# Patient Record
Sex: FEMALE | Race: WHITE | Employment: OTHER | URBAN - METROPOLITAN AREA
[De-identification: names, ages, dates, MRNs, and addresses within clinical notes are randomized per-mention and may not be internally consistent; named-entity substitution may affect disease eponyms.]

---

## 2017-07-26 ENCOUNTER — OFFICE VISIT (OUTPATIENT)
Dept: INTERNAL MEDICINE CLINIC | Age: 77
End: 2017-07-26

## 2017-07-26 VITALS
WEIGHT: 120 LBS | HEART RATE: 68 BPM | SYSTOLIC BLOOD PRESSURE: 117 MMHG | DIASTOLIC BLOOD PRESSURE: 76 MMHG | RESPIRATION RATE: 12 BRPM | OXYGEN SATURATION: 97 % | BODY MASS INDEX: 21.26 KG/M2 | HEIGHT: 63 IN

## 2017-07-26 DIAGNOSIS — F02.80 ALZHEIMER'S DISEASE OF OTHER ONSET WITHOUT BEHAVIORAL DISTURBANCE: ICD-10-CM

## 2017-07-26 DIAGNOSIS — R19.8 GAGGING EPISODE: Primary | ICD-10-CM

## 2017-07-26 DIAGNOSIS — G30.8 ALZHEIMER'S DISEASE OF OTHER ONSET WITHOUT BEHAVIORAL DISTURBANCE: ICD-10-CM

## 2017-07-26 RX ORDER — MEMANTINE HYDROCHLORIDE 28 MG/1
CAPSULE, EXTENDED RELEASE ORAL DAILY
COMMUNITY
End: 2018-08-28 | Stop reason: SDUPTHER

## 2017-07-26 RX ORDER — IBUPROFEN 200 MG
TABLET ORAL
COMMUNITY
End: 2018-07-11 | Stop reason: SDUPTHER

## 2017-07-26 RX ORDER — METOPROLOL SUCCINATE 25 MG/1
TABLET, EXTENDED RELEASE ORAL DAILY
COMMUNITY
End: 2018-03-20 | Stop reason: SDUPTHER

## 2017-07-26 NOTE — PROGRESS NOTES
HISTORY OF PRESENT ILLNESS    New patient to my practice, referred to me by self. Prior medical care has been from Dr. Zainab Fernández in [address] [facility]. Her to be closer to [place], [employer]. she works as a retire  . her  past medical history was reviewed, discussed, and summarized in the list below. Hx is largely provided by her  who interrupts her. Reports \"Gagging sensation\" yesterday. Past hx of same. Her throat was spasming and had trouble swallowing. It was after breakfast..  It lasted for \"hours\" and she went to bed. She slept for a while and woke up feeling. Was referred to GI by PCP in the past, but was told she may not need to go.  says it was due to stress. Denies significant dysphagia or vomiting. Ate breakfast and lunch today without significant issues. Chronic back pain. Hx of surgery. Dementia - she does not drive for about 1 year. Completed 1 year clinic trial at [facility] 6/2016-6/2017. Has MRI, PET scan at baseline and end. MRI showed a possible infarct. She is taking Namenda. Was taking aricept but was stopped 2 years ago since it may interact with metoprolol. Has a syncopal episode which also may have been related to taking metoprolol. Saw neuro Dr. Romelia Garsia    Review of Systems   All other systems reviewed and are negative, except as noted in HPI      Past Medical and Surgical History  Past Medical History:   Diagnosis Date    Alzheimer's dementia     mild-moderate. clinical trial 6/2017-6/2017 [facility] [place] Lumbar stenosis [place]    surgery 2011    Normal cardiac stress test 07/2017    PVCs (premature ventricular contractions)     Retinal disease     Dr. Mary Chairez Subdural hematoma [facility])         has a past surgical history that includes lumbar laminectomy (2011).     Current Outpatient Prescriptions   Medication Sig    metoprolol succinate (TOPROL-XL) 25 mg XL tablet Take  by mouth daily.  ASPIRIN (ASPIR-81 PO) Take  by mouth.  memantine ER (NAMENDA XR) 28 mg capsule Take  by mouth daily.  ibuprofen (ADVIL) 200 mg tablet Take  by mouth.  OTHER Prevagen 1 capsule daily     No current facility-administered medications for this visit. family history is not on file. Physical Exam   Nursing note and vitals reviewed. Blood pressure 117/76, pulse 68, resp. rate 12, height 5' 3\" (1.6 m), weight 120 lb (54.4 kg), SpO2 97 %. Constitutional: oriented to person, place, and time. No distress. Eyes: Conjunctivae are normal.   HEENT:  No cervical lymphadenopathy. No thyroid nodules or goiter  Cardiovascular: Normal rate. Regular rhythm, no murmurs, rubs. No edema  Pulmonary/Chest: Effort normal. clear to auscultation  Abdominal: soft, non-tender, non-distended  Musculoskeletal:     Neurological: Alert and oriented to person, place. Cranial nerves grossly intact. Normal gait   Skin: No rash noted. Psychiatric: Normal mood and affect. Behavior is normal.     ASSESSMENT and PLAN  Diagnoses and all orders for this visit:    1. Gagging episode-- does not sound like this was choking. Improved with sleep. Perhaps it was a hiccough or spasm of diaphragm. Panic attack? Observe for now. Consider esophagram.  Currently asymptomatic    2. Alzheimer's disease of other onset without behavioral disturbance  Mild to moderate. Her  completes her thoughts  I am not sure how severe it is.   Will get records        There are no Patient Instructions on file for this visit.    lab results and schedule of future lab studies reviewed with patient  reviewed medications and side effects in detail    Follow-up Disposition: Not on File

## 2017-07-26 NOTE — PROGRESS NOTES
Presents with a gagging sensation yesterday. Today mentions some LUQ pain. Had some back pain this morning. She has had this gagging before. No studies done.  wonders if stress induced as they had an argument.  states she has mild dementia.

## 2017-07-26 NOTE — MR AVS SNAPSHOT
Visit Information Date & Time Provider Department Dept. Phone Encounter #  
 7/26/2017  1:45 PM Lucas Urbina MD [facility] [address] [id number] Upcoming Health Maintenance Date Due DTaP/Tdap/Td series (1 - Tdap) 8/3/1963 FOBT Q 1 YEAR AGE 50-75 8/3/1992 ZOSTER VACCINE AGE 60> 6/3/2002 GLAUCOMA SCREENING Q2Y 8/3/2007 OSTEOPOROSIS SCREENING (DEXA) 8/3/2007 Pneumococcal 65+ Low/Medium Risk (1 of 2 - PCV13) 8/3/2007 MEDICARE YEARLY EXAM 8/3/2007 INFLUENZA AGE 9 TO ADULT 8/1/2017 Allergies as of 7/26/2017  Review Complete On: 7/26/2017 By: Lucas Urbina MD  
 No Known Allergies Current Immunizations  Never Reviewed No immunizations on file. Not reviewed this visit You Were Diagnosed With   
  
 Codes Comments Gagging episode    -  Primary ICD-10-CM: R19.8 ICD-9-CM: 478.29 Alzheimer's disease of other onset without behavioral disturbance     ICD-10-CM: G30.8, F02.80 ICD-9-CM: 331.0, 294.10 Vitals BP Pulse Resp Height(growth percentile) Weight(growth percentile) SpO2  
 117/76 (BP 1 Location: Left arm, BP Patient Position: Sitting) 68 12 5' 3\" (1.6 m) 120 lb (54.4 kg) 97% BMI  
  
  
  
  
 21.26 kg/m2 Vitals History BMI and BSA Data Body Mass Index Body Surface Area  
 21.26 kg/m 2 1.56 m 2 Your Updated Medication List  
  
   
This list is accurate as of: 7/26/17  2:49 PM.  Always use your most recent med list. ADVIL 200 mg tablet Generic drug:  ibuprofen Take  by mouth. ASPIR-81 PO Take  by mouth.  
  
 metoprolol succinate 25 mg XL tablet Commonly known as:  TOPROL-XL Take  by mouth daily. NAMENDA XR 28 mg capsule Generic drug:  memantine ER Take  by mouth daily. OTHER Prevagen 1 capsule daily Introducing [facility]!    
 Maya Napier introduces [employer] patient portal. Now you can access parts of your medical record, email your doctor's office, and request medication refills online. 1. In your internet browser, go to [email or web address]. [email or web address] 2. Click on the First Time User? Click Here link in the Sign In box. You will see the New Member Sign Up page. 3. Enter your [employer] Access Code exactly as it appears below. You will not need to use this code after youve completed the sign-up process. If you do not sign up before the expiration date, you must request a new code. · [employer] Access Code: [email or web address] Expires: 10/24/2017  1:43 PM 
 
4. Enter the last four digits of your Social Security Number (xxxx) and Date of Birth (mm/dd/yyyy) as indicated and click Submit. You will be taken to the next sign-up page. 5. Create a [employer] ID. This will be your [employer] login ID and cannot be changed, so think of one that is secure and easy to remember. 6. Create a [employer] password. You can change your password at any time. 7. Enter your Password Reset Question and Answer. This can be used at a later time if you forget your password. 8. Enter your e-mail address. You will receive e-mail notification when new information is available in [address] 9. Click Sign Up. You can now view and download portions of your medical record. 10. Click the Download Summary menu link to download a portable copy of your medical information. If you have questions, please visit the Frequently Asked Questions section of the [employer] website. Remember, [employer] is NOT to be used for urgent needs. For medical emergencies, dial 911. Now available from your iPhone and Android! Please provide this summary of care documentation to your next provider. Your primary care clinician is listed as Mima Lanier. If you have any questions after today's visit, please call [phone].

## 2017-09-23 ENCOUNTER — APPOINTMENT (OUTPATIENT)
Dept: GENERAL RADIOLOGY | Age: 77
End: 2017-09-23
Attending: NURSE PRACTITIONER
Payer: MEDICARE

## 2017-09-23 ENCOUNTER — HOSPITAL ENCOUNTER (EMERGENCY)
Age: 77
Discharge: HOME | End: 2017-09-23
Attending: EMERGENCY MEDICINE | Admitting: SPECIALIST
Payer: MEDICARE

## 2017-09-23 VITALS
DIASTOLIC BLOOD PRESSURE: 57 MMHG | HEIGHT: 63 IN | SYSTOLIC BLOOD PRESSURE: 135 MMHG | WEIGHT: 118 LBS | OXYGEN SATURATION: 100 % | TEMPERATURE: 97.6 F | RESPIRATION RATE: 13 BRPM | HEART RATE: 47 BPM | BODY MASS INDEX: 20.91 KG/M2

## 2017-09-23 DIAGNOSIS — T18.198A OTHER FOREIGN OBJECT IN ESOPHAGUS CAUSING OTHER INJURY, INITIAL ENCOUNTER: Primary | ICD-10-CM

## 2017-09-23 LAB — TROPONIN I BLD-MCNC: <0.04 NG/ML (ref 0–0.08)

## 2017-09-23 PROCEDURE — 84484 ASSAY OF TROPONIN QUANT: CPT

## 2017-09-23 PROCEDURE — 77030007290 HC DEV RTVR RTH STRC -G: Performed by: SPECIALIST

## 2017-09-23 PROCEDURE — 71020 XR CHEST PA LAT: CPT

## 2017-09-23 PROCEDURE — 96360 HYDRATION IV INFUSION INIT: CPT

## 2017-09-23 PROCEDURE — 99285 EMERGENCY DEPT VISIT HI MDM: CPT

## 2017-09-23 PROCEDURE — 93005 ELECTROCARDIOGRAM TRACING: CPT

## 2017-09-23 PROCEDURE — 74011250636 HC RX REV CODE- 250/636: Performed by: EMERGENCY MEDICINE

## 2017-09-23 PROCEDURE — 96361 HYDRATE IV INFUSION ADD-ON: CPT

## 2017-09-23 PROCEDURE — 94762 N-INVAS EAR/PLS OXIMTRY CONT: CPT

## 2017-09-23 PROCEDURE — 94761 N-INVAS EAR/PLS OXIMETRY MLT: CPT

## 2017-09-23 PROCEDURE — 77030031493 HC DEV ENDOSC GRSP RAPT STRC -B: Performed by: SPECIALIST

## 2017-09-23 PROCEDURE — 76040000019: Performed by: SPECIALIST

## 2017-09-23 PROCEDURE — 74011250636 HC RX REV CODE- 250/636: Performed by: SPECIALIST

## 2017-09-23 RX ORDER — FLUMAZENIL 0.1 MG/ML
0.2 INJECTION INTRAVENOUS
Status: DISCONTINUED | OUTPATIENT
Start: 2017-09-23 | End: 2017-09-23 | Stop reason: HOSPADM

## 2017-09-23 RX ORDER — DEXTROMETHORPHAN/PSEUDOEPHED 2.5-7.5/.8
1.2 DROPS ORAL
Status: DISCONTINUED | OUTPATIENT
Start: 2017-09-23 | End: 2017-09-23 | Stop reason: HOSPADM

## 2017-09-23 RX ORDER — NALOXONE HYDROCHLORIDE 0.4 MG/ML
0.4 INJECTION, SOLUTION INTRAMUSCULAR; INTRAVENOUS; SUBCUTANEOUS
Status: DISCONTINUED | OUTPATIENT
Start: 2017-09-23 | End: 2017-09-23 | Stop reason: HOSPADM

## 2017-09-23 RX ORDER — SODIUM CHLORIDE 9 MG/ML
50 INJECTION, SOLUTION INTRAVENOUS CONTINUOUS
Status: DISCONTINUED | OUTPATIENT
Start: 2017-09-23 | End: 2017-09-23 | Stop reason: HOSPADM

## 2017-09-23 RX ORDER — MIDAZOLAM HYDROCHLORIDE 1 MG/ML
.25-5 INJECTION, SOLUTION INTRAMUSCULAR; INTRAVENOUS
Status: DISCONTINUED | OUTPATIENT
Start: 2017-09-23 | End: 2017-09-23 | Stop reason: HOSPADM

## 2017-09-23 RX ORDER — FENTANYL CITRATE 50 UG/ML
100 INJECTION, SOLUTION INTRAMUSCULAR; INTRAVENOUS
Status: DISCONTINUED | OUTPATIENT
Start: 2017-09-23 | End: 2017-09-23 | Stop reason: HOSPADM

## 2017-09-23 RX ORDER — FLUMAZENIL 0.1 MG/ML
INJECTION INTRAVENOUS
Status: DISCONTINUED
Start: 2017-09-23 | End: 2017-09-23 | Stop reason: HOSPADM

## 2017-09-23 RX ORDER — NALOXONE HYDROCHLORIDE 0.4 MG/ML
INJECTION, SOLUTION INTRAMUSCULAR; INTRAVENOUS; SUBCUTANEOUS
Status: DISCONTINUED
Start: 2017-09-23 | End: 2017-09-23 | Stop reason: HOSPADM

## 2017-09-23 RX ORDER — SODIUM CHLORIDE 9 MG/ML
125 INJECTION, SOLUTION INTRAVENOUS CONTINUOUS
Status: DISCONTINUED | OUTPATIENT
Start: 2017-09-23 | End: 2017-09-23 | Stop reason: HOSPADM

## 2017-09-23 RX ADMIN — FENTANYL CITRATE 50 MCG: 50 INJECTION, SOLUTION INTRAMUSCULAR; INTRAVENOUS at 13:45

## 2017-09-23 RX ADMIN — MIDAZOLAM HYDROCHLORIDE 2 MG: 1 INJECTION, SOLUTION INTRAMUSCULAR; INTRAVENOUS at 13:52

## 2017-09-23 RX ADMIN — MIDAZOLAM HYDROCHLORIDE 2 MG: 1 INJECTION, SOLUTION INTRAMUSCULAR; INTRAVENOUS at 13:49

## 2017-09-23 RX ADMIN — MIDAZOLAM HYDROCHLORIDE 2 MG: 1 INJECTION, SOLUTION INTRAMUSCULAR; INTRAVENOUS at 13:45

## 2017-09-23 RX ADMIN — SODIUM CHLORIDE 125 ML/HR: 900 INJECTION, SOLUTION INTRAVENOUS at 12:47

## 2017-09-23 NOTE — ED NOTES
Pt responds to voice and tactile stimuli. Pt able to recognize family. Denies any pain at this time.

## 2017-09-23 NOTE — ED NOTES
Pt up and talking with family members. Kitty Solano NP, at bedside reassessing pt. Pt given ginger ale and peanut butter and crackers for PO challenge.

## 2017-09-23 NOTE — ED NOTES
CONSULT NOTE:  12:20 PM Gloria Rosas MD spoke with Dr. Matthew Hodges, Consult for Pulmonology. Discussed available diagnostic tests and clinical findings. He is in agreement with care plans as outlined. Dr. Matthew Hodges reviewed patient's x-ray and thinks since patient is not having any breathing issues, foreign body is likely in patient's esophagus. CONSULT NOTE:  12:28 PM Gloria Rosas MD spoke with Dr. Nolan Morrissey, Consult for Gastroenterology. Discussed available diagnostic tests and clinical findings. He is in agreement with care plans as outlined. Dr. Nolan Morrissey wants the nursing supervisor to call.

## 2017-09-23 NOTE — CONSULTS
Azra Gillis. Bronwyn Lew MD  [phone] office  [phone] voicemail   Gastroenterology Consultation Note      Admit Date: 9/23/2017  Consult Date: 9/23/2017   I greatly appreciate your asking me to see Awa Flynn, thank you very much for the opportunity to participate in her care. Narrative Assessment and Plan   · Foreign object - probably in esophagus based on her complaints and imaging. Plan for endoscopic removal   If no foreign object seen on endoscopy may need to consider pulmonary eval or repeat imaging. Discussed indications, risks, benefits and alternatives with her  who agrees    Subjective:     Chief Complaint: Dysphagia    History of Present Illness: Can't get history from patient related to alzheimers. She went to bathroom this morning then told her  she ate a quarter. Complained to ER doctor of mid thoracic discomfort. Imaging shows coin in chest.    PCP:  Zohaib Contreras MD    Past Medical History:   Diagnosis Date    Alzheimer's dementia     mild-moderate. clinical trial 6/2017[place]6/2017 [facility] [place] Lumbar stenosis [place]    surgery 2011    Normal cardiac stress test 07/2017    PVCs (premature ventricular contractions)     Retinal disease     Dr. Rich Davis    Subdural hematoma [facility])         Past Surgical History:   Procedure Laterality Date    HX LUMBAR LAMINECTOMY  2011       Social History   Substance Use Topics    Smoking status: Former Smoker    Smokeless tobacco: Never Used    Alcohol use Yes      Comment: occaisional        History reviewed. No pertinent family history. Allergies   Allergen Reactions    Oxycodone Other (comments)     psychosis              Home Medications:  Prior to Admission Medications   Prescriptions Last Dose Informant Patient Reported? Taking? ASPIRIN (ASPIR-81 PO) 9/22/2017 at Unknown time  Yes Yes   Sig: Take  by mouth.    OTHER   Yes No   Sig: Prevagen 1 capsule daily   ibuprofen (ADVIL) 200 mg tablet Yes No   Sig: Take  by mouth.   memantine ER (NAMENDA XR) 28 mg capsule   Yes No   Sig: Take  by mouth daily. metoprolol succinate (TOPROL-XL) 25 mg XL tablet   Yes No   Sig: Take  by mouth daily. Facility-Administered Medications: None       Hospital Medications:  Current Facility-Administered Medications   Medication Dose Route Frequency    0.9% sodium chloride infusion  125 mL/hr IntraVENous CONTINUOUS    0.9% sodium chloride infusion  50 mL/hr IntraVENous CONTINUOUS    midazolam (VERSED) injection 0.25-5 mg  0.25-5 mg IntraVENous Multiple    fentaNYL citrate (PF) injection 100 mcg  100 mcg IntraVENous Multiple    naloxone (NARCAN) injection 0.4 mg  0.4 mg IntraVENous Multiple    flumazenil (ROMAZICON) 0.1 mg/mL injection 0.2 mg  0.2 mg IntraVENous Multiple    simethicone (MYLICON) 11BM/6.2RO oral drops 80 mg  1.2 mL Oral Multiple    naloxone (NARCAN) 0.4 mg/mL injection        flumazenil (ROMAZICON) 0.1 mg/mL injection         Current Outpatient Prescriptions   Medication Sig    ASPIRIN (ASPIR-81 PO) Take  by mouth.  metoprolol succinate (TOPROL-XL) 25 mg XL tablet Take  by mouth daily.  memantine ER (NAMENDA XR) 28 mg capsule Take  by mouth daily.  ibuprofen (ADVIL) 200 mg tablet Take  by mouth.     OTHER Prevagen 1 capsule daily       Review of Systems: Admission ROS by James Clayton MD from 9/23/2017 were reviewed with the patient and changes (other than per HPI) include: none      Objective:     Physical Exam:  Visit Vitals    /75    Pulse (!) 53    Temp 97.6 °F (36.4 °C)    Resp 18    Ht 5' 3\" (1.6 m)    Wt 53.5 kg (118 lb)    SpO2 98%    BMI 20.9 kg/m2     SpO2 Readings from Last 6 Encounters:   09/23/17 98%   07/26/17 97%        No intake or output data in the 24 hours ending 09/23/17 1356   General: no distress, comfortable  Skin:  No rash or palpable dermatologic mass lesions  HEENT: Pupils equal, sclera anicteric, oropharynx with no gross lesions  Cardiovascular: No abnormal audible heart sounds, well perfused, no edema  Respiratory:  No abnormal audible breath sounds, normal respiratory effort, no throacic deformity  GI:  Abdomen nondistended, nontender, no mass, no free fluid, no rebound or guarding. Musculoskeletal:  No skeletal deformity nor acute arthritis noted. Neurological:  Motor and sensory function intact in upper extremeties  Psychiatric:  Normal affect, memory not intact, appears not to have insight into current illness  Lymphatic:  No cervical, supraclavicular, or periumbilic lymphadenopathy    Laboratory:    Recent Results (from the past 24 hour(s))   EKG, 12 LEAD, INITIAL    Collection Time: 09/23/17 11:18 AM   Result Value Ref Range    Ventricular Rate 51 BPM    Atrial Rate 51 BPM    P-R Interval 132 ms    QRS Duration 90 ms    Q-T Interval 458 ms    QTC Calculation (Bezet) 422 ms    Calculated P Axis 49 degrees    Calculated R Axis 46 degrees    Calculated T Axis 24 degrees    Diagnosis       Sinus bradycardia  Otherwise normal ECG  No previous ECGs available     POC TROPONIN-I    Collection Time: 09/23/17 11:28 AM   Result Value Ref Range    Troponin-I (POC) <0.04 0.00 - 0.08 ng/mL         Assessment/Plan:     Active Problems:    * No active hospital problems. *       See above narrative for full detail.

## 2017-09-23 NOTE — PERIOP NOTES
TRANSFER - OUT REPORT:    Verbal report given to NATHANAEL Berry (name) on Sin Mitchell  being transferred to ED (unit) for ordered procedure       Report consisted of patients Situation, Background, Assessment and   Recommendations(SBAR). Information from the following report(s) Procedure Summary, MAR and Recent Results was reviewed with the receiving nurse. Lines:   Peripheral IV 09/23/17 Left Arm (Active)   Site Assessment Clean, dry, & intact 9/23/2017  1:22 PM   Phlebitis Assessment 0 9/23/2017  1:22 PM   Infiltration Assessment 0 9/23/2017  1:22 PM   Dressing Status Clean, dry, & intact 9/23/2017  1:22 PM   Dressing Type Transparent 9/23/2017  1:22 PM   Hub Color/Line Status Pink; Infusing 9/23/2017  1:22 PM   Action Taken Blood drawn 9/23/2017 11:30 AM        Opportunity for questions and clarification was provided.       Patient transported with:   no need for transport

## 2017-09-23 NOTE — ED NOTES
Assisted pt in ambulating around nurse's station. Pt unsteady and requires 1 assist. Family aware. Patient discharged by Ashley Thayer NP. Papers given and questions answered. Home with family.

## 2017-09-23 NOTE — ED PROVIDER NOTES
HPI Comments: 78 y.o. female with past medical history significant for Subdural hematoma, PVCs, Alzheimer's who presents from home driven by spouse with for evaluation of chest discomfort s/p coin ingestion. Pt with hx of mild to moderate alzheimer's, awoke this morning to take her medication. Upon taking her medications, she mistook a quarter as her medication and ingested the coin. Pt states since ingestion, she has had chest discomfort radiating into epigastric region. She notes pain to the right side of her chest with deep inspiration. Per spouse, pt is typically monitored during taking her medication but he was sleep at the time. Pt denies nausea, vomiting, SOB or sore throat. There are no other acute medical concerns at this time. Full history, physical exam, and ROS unable to be obtained due to:  dementia. PCP: Tanika Rahman MD  Past Medical History:  No date: Alzheimer's dementia      Comment: mild-moderate. clinical trial 6/2017-6/2017                [place]  2011: Lumbar stenosis      Comment: surgery 2011 07/2017: Normal cardiac stress test  No date: PVCs (premature ventricular contractions)  No date: Retinal disease      Comment: Dr. Benjamin Goncalves  No date: Subdural hematoma [facility])  Past Surgical History:  2011: HX LUMBAR LAMINECTOMY      Note written by Violeta Kruse, as dictated by Gomez Candelaria NP 6:07 PM    The history is provided by the patient and the spouse. No  was used. Past Medical History:   Diagnosis Date    Alzheimer's dementia     mild-moderate. clinical trial 6/2017-6/2017 [facility] [place] Lumbar stenosis 2011    surgery 2011    Normal cardiac stress test 07/2017    PVCs (premature ventricular contractions)     Retinal disease     Dr. Benjamin Goncalves    Subdural hematoma [facility])        Past Surgical History:   Procedure Laterality Date    HX LUMBAR LAMINECTOMY  2011         History reviewed. No pertinent family history.     Social History     Social History    Marital status:      Spouse name: N/A    Number of children: N/A    Years of education: N/A     Occupational History    Not on file. Social History Main Topics    Smoking status: Former Smoker    Smokeless tobacco: Never Used    Alcohol use Yes      Comment: occaisional    Drug use: No    Sexual activity: Not on file     Other Topics Concern    Not on file     Social History Narrative         ALLERGIES: Oxycodone    Review of Systems   Unable to perform ROS: Dementia   Constitutional: Negative for activity change, chills, diaphoresis, fatigue and fever. HENT: Negative for congestion, ear discharge, ear pain, sinus pain, sinus pressure, sore throat and trouble swallowing. Eyes: Negative for redness, itching and visual disturbance. Respiratory: Negative for cough, choking, chest tightness, shortness of breath and wheezing. Cardiovascular: Negative for chest pain and palpitations. Gastrointestinal: Negative for abdominal distention, abdominal pain, nausea and vomiting. Endocrine: Negative. Genitourinary: Negative for difficulty urinating, flank pain, frequency and urgency. Musculoskeletal: Negative for back pain, gait problem, neck pain and neck stiffness. Skin: Negative for color change, pallor, rash and wound. Allergic/Immunologic: Negative. Neurological: Negative for dizziness, speech difficulty, weakness and headaches. Hematological: Does not bruise/bleed easily. Psychiatric/Behavioral: Negative for behavioral problems. The patient is not nervous/anxious. Vitals:    09/23/17 1450 09/23/17 1500 09/23/17 1527 09/23/17 1530   BP: 138/52 133/59 121/50 135/57   Pulse: (!) 46 (!) 52 (!) 48 (!) 47   Resp: 11 13 14 13   Temp:       SpO2: 100% 95% 100% 100%   Weight:       Height:                Physical Exam   Constitutional: She is oriented to person, place, and time. She appears well-developed and well-nourished. No distress.    HENT:   Head: Normocephalic and atraumatic. Right Ear: External ear normal.   Left Ear: External ear normal.   Nose: Nose normal.   Mouth/Throat: Oropharynx is clear and moist.   Eyes: Conjunctivae and EOM are normal. Pupils are equal, round, and reactive to light. Right eye exhibits no discharge. Left eye exhibits no discharge. Neck: Normal range of motion. Neck supple. No JVD present. No tracheal deviation present. Cardiovascular: Normal rate, regular rhythm, normal heart sounds and intact distal pulses. Exam reveals no gallop. No murmur heard. Pulmonary/Chest: Effort normal and breath sounds normal. No respiratory distress. She has no wheezes. She has no rales. She exhibits no tenderness. Abdominal: Soft. Bowel sounds are normal. She exhibits no distension. There is no tenderness. There is no rebound and no guarding. Genitourinary:   Genitourinary Comments: Negative     Musculoskeletal: Normal range of motion. She exhibits no edema or tenderness. Neurological: She is alert and oriented to person, place, and time. Skin: Skin is warm and dry. No rash noted. No erythema. No pallor. Psychiatric: She has a normal mood and affect. Her behavior is normal. Judgment and thought content normal.   Nursing note and vitals reviewed. MDM  Number of Diagnoses or Management Options  Other foreign object in esophagus causing other injury, initial encounter: new and requires workup  Diagnosis management comments: Plan:  Discharge to home with procedural sedation education. Reviewed with  who verbalizes understanding. Amount and/or Complexity of Data Reviewed  Discuss the patient with other providers: yes (Discussed plan of care with Dr. Janett Wilkerson)      ED Course   0310 [id number]: Initial assessment of patient as documented. 1130: Chest xray complete. Awaiting final read. 1200: [place] identified in esophagus versus bronchus on review of film. Reviewed with Dr. Janett Wilkerson. Pulmonary paged.   Patient vitals within normal limits. States she feels the coin when she takes a deep breath. 1220: According to  Dr. Janett Wilkerson and pulmonary, coin in esophagus. GI consulted. 1230: Dr. Janett Wilkerson spoke with GI Dr. Temi Deleon, who will perform a bedside retrieval.   1400: Patient resting comfortably awaiting GI.  1459: Endoscopy performed by Dr. Temi Deleon successfully. 1520: Patient awake and alert, answering all questions. Eating crackers. 1559:   Pt has been reexamined. Pt has no new complaints, changes or physical findings. Care plan outlined and precautions discussed. All available results were reviewed with pt. All medications were reviewed with pt. All of pt's questions and concerns were addressed. Pt agrees to F/U as instructed and agrees to return to ED upon further deterioration. Pt is ready to go home. Marielle Cummins NP      Procedures    ED EKG interpretation:  Rhythm: sinus bradycardia;  Rate (approx.): 51 bpm; Axis: normal; ST/T wave: normal.  Note written by Violeta Herzog, as dictated by Mukund Ford MD 11:18 AM

## 2017-09-23 NOTE — CONSULTS
Consult was called regarding possible quarter in her airway. It was in the esophagus and managed via GI with endoscopy. Call if questions.

## 2017-09-23 NOTE — PROCEDURES
[address] [facility] D. Marylen Crofts, MD  [phone]      2017    Esophagogastroduodenoscopy (EGD) Procedure Note  Peyton Rogers  : 1940  Mary Alice Sánchez Medical Record Number: [id number]      Indications:    Dyphagia/odynophagia  Referring Physician:  Shimon Garcia MD  Anesthesia/Sedation: Conscious Sedation/Moderate Sedation. START TIME: 1013, PROCEDURE END 1402  Endoscopist:  Dr. Teena Eubanks  Complications:  None  Estimated Blood Loss:  None    Permit:  The indications, risks, benefits and alternatives were reviewed with the patient or their decision maker who was provided an opportunity to ask questions and all questions were answered. The specific risks of esophagogastroduodenoscopy with conscious sedation were reviewed, including but not limited to anesthetic complication, bleeding, adverse drug reaction, missed lesion, infection, IV site reactions, and intestinal perforation which would lead to the need for surgical repair. Alternatives to EGD including radiographic imaging, observation without testing, or laboratory testing were reviewed as well as the limitations of those alternatives discussed. After considering the options and having all their questions answered, the patient or their decision maker provided both verbal and written consent to proceed. Procedure in Detail:  After obtaining informed consent, positioning of the patient in the left lateral decubitus position, and conduction of a pre-procedure pause or \"time out\" the endoscope was introduced into the mouth and advanced to the duodenum. A careful inspection was made, and findings or interventions are described below. Findings:   Esophagus: US quarter in esophagus - series . We pushed it into the stomach and used a soto net to secure the foreign body and remove it out the mouth.   It'll be delivered to the . Stomach: normal   Duodenum/jejunum: normal      Specimens: none    Impression: Foreign object in esophagus - removed. Recommendations:  -Discharge when she recovers from anesthesia. Thank you for entrusting me with this patient's care. Please do not hesitate to contact me with any questions or if I can be of assistance with any of your other patients' GI needs.   Signed By: Virgen Light MD                        September 23, 2017

## 2017-09-23 NOTE — PERIOP NOTES
Pt toerated EGD well. ABD remains soft and non-tender post procedure. Pt has no complaints at this time and tolerated the procedure well.

## 2017-09-24 LAB
ATRIAL RATE: 51 BPM
CALCULATED P AXIS, ECG09: 49 DEGREES
CALCULATED R AXIS, ECG10: 46 DEGREES
CALCULATED T AXIS, ECG11: 24 DEGREES
DIAGNOSIS, 93000: NORMAL
P-R INTERVAL, ECG05: 132 MS
Q-T INTERVAL, ECG07: 458 MS
QRS DURATION, ECG06: 90 MS
QTC CALCULATION (BEZET), ECG08: 422 MS
VENTRICULAR RATE, ECG03: 51 BPM

## 2017-11-13 ENCOUNTER — OFFICE VISIT (OUTPATIENT)
Dept: INTERNAL MEDICINE CLINIC | Age: 77
End: 2017-11-13

## 2017-11-13 VITALS
OXYGEN SATURATION: 98 % | BODY MASS INDEX: 21.55 KG/M2 | HEART RATE: 62 BPM | TEMPERATURE: 97.6 F | WEIGHT: 121.6 LBS | RESPIRATION RATE: 12 BRPM | DIASTOLIC BLOOD PRESSURE: 72 MMHG | SYSTOLIC BLOOD PRESSURE: 122 MMHG | HEIGHT: 63 IN

## 2017-11-13 DIAGNOSIS — G30.8 ALZHEIMER'S DISEASE OF OTHER ONSET WITHOUT BEHAVIORAL DISTURBANCE: Primary | ICD-10-CM

## 2017-11-13 DIAGNOSIS — Z11.1 SCREENING EXAMINATION FOR PULMONARY TUBERCULOSIS: ICD-10-CM

## 2017-11-13 DIAGNOSIS — I10 ESSENTIAL HYPERTENSION: ICD-10-CM

## 2017-11-13 DIAGNOSIS — F02.80 ALZHEIMER'S DISEASE OF OTHER ONSET WITHOUT BEHAVIORAL DISTURBANCE: Primary | ICD-10-CM

## 2017-11-13 DIAGNOSIS — Z00.00 WELL ADULT HEALTH CHECK: ICD-10-CM

## 2017-11-13 NOTE — MR AVS SNAPSHOT
Visit Information Date & Time Provider Department Dept. Phone Encounter #  
 11/13/2017  3:00 PM Rosy London NP [facility] [address] [id number] Upcoming Health Maintenance Date Due DTaP/Tdap/Td series (1 - Tdap) 8/3/1963 ZOSTER VACCINE AGE 60> 6/3/2002 GLAUCOMA SCREENING Q2Y 8/3/2007 OSTEOPOROSIS SCREENING (DEXA) 8/3/2007 Pneumococcal 65+ Low/Medium Risk (1 of 2 - PCV13) 8/3/2007 MEDICARE YEARLY EXAM 8/3/2007 Influenza Age 5 to Adult 8/1/2017 Allergies as of 11/13/2017  Review Complete On: 11/13/2017 By: Rosanna Forrest LPN Severity Noted Reaction Type Reactions Oxycodone  09/23/2017    Other (comments) psychosis Current Immunizations  Never Reviewed Name Date  
 TB Skin Test (PPD) Intradermal  Incomplete Not reviewed this visit You Were Diagnosed With   
  
 Codes Comments Alzheimer's disease of other onset without behavioral disturbance    -  Primary ICD-10-CM: G30.8, F02.80 ICD-9-CM: 331.0, 294.10 Well adult health check     ICD-10-CM: Z00.00 ICD-9-CM: V70.0 Screening examination for pulmonary tuberculosis     ICD-10-CM: Z11.1 ICD-9-CM: V74.1 Essential hypertension     ICD-10-CM: I10 
ICD-9-CM: 401.9 Vitals BP Pulse Temp Resp Height(growth percentile) Weight(growth percentile) 131/63 (BP 1 Location: Left arm, BP Patient Position: Sitting) 62 97.6 °F (36.4 °C) (Oral) 12 5' 3\" (1.6 m) 121 lb 9.6 oz (55.2 kg) SpO2 BMI OB Status Smoking Status 98% 21.54 kg/m2 Postmenopausal Former Smoker BMI and BSA Data Body Mass Index Body Surface Area  
 21.54 kg/m 2 1.57 m 2 Preferred Pharmacy Pharmacy Name Phone [id number] [address] ([place] [address]) [phone] Your Updated Medication List  
  
   
This list is accurate as of: 11/13/17  4:05 PM.  Always use your most recent med list. ADVIL 200 mg tablet Generic drug:  ibuprofen Take  by mouth. ASPIR-81 PO Take  by mouth.  
  
 metoprolol succinate 25 mg XL tablet Commonly known as:  TOPROL-XL Take  by mouth daily. NAMENDA XR 28 mg capsule Generic drug:  memantine ER Take  by mouth daily. OTHER Prevagen 1 capsule daily PROBIOTIC PO Take  by mouth. We Performed the Following AMB POC TUBERCULOSIS, INTRADERMAL (SKIN TEST) [32167 CPT(R)] Patient Instructions Well Visit, Over 72: Care Instructions Your Care Instructions Physical exams can help you stay healthy. Your doctor has checked your overall health and may have suggested ways to take good care of yourself. He or she also may have recommended tests. At home, you can help prevent illness with healthy eating, regular exercise, and other steps. Follow-up care is a key part of your treatment and safety. Be sure to make and go to all appointments, and call your doctor if you are having problems. It's also a good idea to know your test results and keep a list of the medicines you take. How can you care for yourself at home? · Reach and stay at a healthy weight. This will lower your risk for many problems, such as obesity, diabetes, heart disease, and high blood pressure. · Get at least 30 minutes of exercise on most days of the week. Walking is a good choice. You also may want to do other activities, such as running, swimming, cycling, or playing tennis or team sports. · Do not smoke. Smoking can make health problems worse. If you need help quitting, talk to your doctor about stop-smoking programs and medicines. These can increase your chances of quitting for good. · Protect your skin from too much sun. When you're outdoors from 10 a.m. to 4 p.m., stay in the shade or cover up with clothing and a hat with a wide brim. Wear sunglasses that block UV rays.  Even when it's cloudy, put broad-spectrum sunscreen (SPF 30 or higher) on any exposed skin. · See a dentist one or two times a year for checkups and to have your teeth cleaned. · Wear a seat belt in the car. · Limit alcohol to 2 drinks a day for men and 1 drink a day for women. Too much alcohol can cause health problems. Follow your doctor's advice about when to have certain tests. These tests can spot problems early. For men and women · Cholesterol. Your doctor will tell you how often to have this done based on your overall health and other things that can increase your risk for heart attack and stroke. · Blood pressure. Have your blood pressure checked during a routine doctor visit. Your doctor will tell you how often to check your blood pressure based on your age, your blood pressure results, and other factors. · Diabetes. Ask your doctor whether you should have tests for diabetes. · Vision. Experts recommend that you have yearly exams for glaucoma and other age-related eye problems. · Hearing. Tell your doctor if you notice any change in your hearing. You can have tests to find out how well you hear. · Colon cancer tests. Keep having colon cancer tests as your doctor recommends. You can have one of several types of tests. · Heart attack and stroke risk. At least every 4 to 6 years, you should have your risk for heart attack and stroke assessed. Your doctor uses factors such as your age, blood pressure, cholesterol, and whether you smoke or have diabetes to show what your risk for a heart attack or stroke is over the next 10 years. · Osteoporosis. Talk to your doctor about whether you should have a bone density test to find out whether you have thinning bones. Also ask your doctor about whether you should take calcium and vitamin D supplements. For women · Pap test and pelvic exam. You may no longer need a Pap test. Talk with your doctor about whether to stop or continue to have Pap tests. · Breast exam and mammogram. Ask how often you should have a mammogram, which is an X-ray of your breasts. A mammogram can spot breast cancer before it can be felt and when it is easiest to treat. · Thyroid disease. Talk to your doctor about whether to have your thyroid checked as part of a regular physical exam. Women have an increased chance of a thyroid problem. For men · Prostate exam. Talk to your doctor about whether you should have a blood test (called a PSA test) for prostate cancer. Experts disagree on whether men should have this test. Some experts recommend that you discuss the benefits and risks of the test with your doctor. · Abdominal aortic aneurysm. Ask your doctor whether you should have a test to check for an aneurysm. You may need a test if you ever smoked or if your parent, brother, sister, or child has had an aneurysm. When should you call for help? Watch closely for changes in your health, and be sure to contact your doctor if you have any problems or symptoms that concern you. Where can you learn more? Go to http://pipo-vaughn.info/. Enter D555 in the search box to learn more about \"Well Visit, Over 65: Care Instructions. \" Current as of: May 12, 2017 Content Version: 11.4 © 4392-1691 Healthwise, Incorporated. Care instructions adapted under license by [employer] (which disclaims liability or warranty for this information). If you have questions about a medical condition or this instruction, always ask your healthcare professional. [place] [address] any warranty or liability for your use of this information. Introducing \[facility] [place] [facility]\"" & HEALTH SERVICES! [address] introduces [employer] patient portal. Now you can access parts of your medical record, email your doctor's office, and request medication refills online. 1. In your internet browser, go to [email or web address] 2. Click on the First Time User? Click Here link in the Sign In box. You will see the New Member Sign Up page. 3. Enter your [employer] Access Code exactly as it appears below. You will not need to use this code after youve completed the sign-up process. If you do not sign up before the expiration date, you must request a new code. · [employer] Access Code: [email or web address] Expires: 2/11/2018  4:05 PM 
 
4. Enter the last four digits of your Social Security Number (xxxx) and Date of Birth (mm/dd/yyyy) as indicated and click Submit. You will be taken to the next sign-up page. 5. Create a [employer] ID. This will be your [employer] login ID and cannot be changed, so think of one that is secure and easy to remember. 6. Create a [employer] password. You can change your password at any time. 7. Enter your Password Reset Question and Answer. This can be used at a later time if you forget your password. 8. Enter your e-mail address. You will receive e-mail notification when new information is available in [address] 9. Click Sign Up. You can now view and download portions of your medical record. 10. Click the Download Summary menu link to download a portable copy of your medical information. If you have questions, please visit the Frequently Asked Questions section of the [employer] website. Remember, [employer] is NOT to be used for urgent needs. For medical emergencies, dial 911. Now available from your iPhone and Android! Please provide this summary of care documentation to your next provider. Your primary care clinician is listed as Rsoa Fernandez. If you have any questions after today's visit, please call [phone].

## 2017-11-13 NOTE — PATIENT INSTRUCTIONS

## 2017-11-14 NOTE — PROGRESS NOTES
HISTORY OF PRESENT ILLNESS  Doris Kovacs is a 78 y.o. female. HPI   Doris Kovacs is here for physical exam and screening in order to participate with [facility].  she does not have other concerns. Health maintenance hx includes:  Exercise: moderately active. Form of exercise: walking   Diet: generally follows a low fat low cholesterol diet    Her  does all household chores including cooking and managing her medication. She has Alzheimer's disease and  provides most of history. She is having more issues with memory loss and cannot remember family member names. She needs to have PPD placed for tuberculosis screening and will return to office within 48-72 hours. Denies previous positive PPD, known exposure to tuberculosis. Denies chronic cough, hemoptysis, fever, chills. Subjective:   Doris Kovacs is a 78 y.o. female with hypertension. Hypertension ROS: taking medications as instructed, no medication side effects noted, no TIA's, no chest pain on exertion, no dyspnea on exertion, no swelling of ankles. New concerns: has been stable on Metoprolol XL 25 mg daily      Immunizations:     Immunization History   Administered Date(s) Administered    TB Skin Test (PPD) Intradermal 11/13/2017      Immunization status: up to date and documented, due today. Social History     Social History    Marital status:      Spouse name: N/A    Number of children: N/A    Years of education: N/A     Occupational History    Not on file.      Social History Main Topics    Smoking status: Former Smoker    Smokeless tobacco: Never Used    Alcohol use Yes      Comment: occaisional    Drug use: No    Sexual activity: Not on file     Other Topics Concern    Not on file     Social History Narrative     Past Surgical History:   Procedure Laterality Date    HX HEENT      vitrectomy    HX LUMBAR LAMINECTOMY  2011    HX ORTHOPAEDIC      hammertoe repair    HX TUBAL LIGATION      HX UROLOGICAL      bladder suspension     Family History   Problem Relation Age of Onset    Lung Disease Mother     Hypertension Mother     Lung Disease Father    [employer] Ar[employer]ritis-osteo Sister     No Known Problems Brother     No Known Problems Daughter     No Known Problems Daughter      Current Outpatient Prescriptions on File Prior to Visit   Medication Sig Dispense Refill    metoprolol succinate (TOPROL-XL) 25 mg XL tablet Take  by mouth daily.  ASPIRIN (ASPIR-81 PO) Take  by mouth.  memantine ER (NAMENDA XR) 28 mg capsule Take  by mouth daily.  OTHER Prevagen 1 capsule daily      ibuprofen (ADVIL) 200 mg tablet Take  by mouth. No current facility-administered medications on file prior to visit. .  Review of Systems   Constitutional: Negative for chills, fever and malaise/fatigue. HENT: Negative for congestion, sinus pain and sore throat. Respiratory: Negative for cough and shortness of breath. Cardiovascular: Negative for chest pain, palpitations and leg swelling. Gastrointestinal: Negative for abdominal pain, blood in stool, nausea and vomiting. Genitourinary: Negative for dysuria and frequency. Musculoskeletal: Positive for back pain. Neurological: Negative for dizziness and headaches. /72 (BP 1 Location: Left arm, BP Patient Position: Sitting)  Pulse 62  Temp 97.6 °F (36.4 °C) (Oral)   Resp 12  Ht 5' 3\" (1.6 m)  Wt 121 lb 9.6 oz (55.2 kg)  SpO2 98%  BMI 21.54 kg/m2  Physical Exam   Constitutional: She appears well-developed and well-nourished. HENT:   Head: Normocephalic and atraumatic. Right Ear: External ear normal.   Left Ear: External ear normal.   Nose: Nose normal.   Mouth/Throat: Oropharynx is clear and moist.   Neck: Normal range of motion. Neck supple. No thyromegaly present. Cardiovascular: Normal rate and regular rhythm. Pulmonary/Chest: Effort normal and breath sounds normal. She has no wheezes. Abdominal: Soft. Bowel sounds are normal. There is no tenderness. There is no rebound. Musculoskeletal: Normal range of motion. She exhibits no edema. Lymphadenopathy:     She has no cervical adenopathy. Neurological: She is alert. Disoriented; answers some questions appropriately but often tangential thoughts. Skin: Skin is warm and dry. Psychiatric: She has a normal mood and affect. Her behavior is normal.   Nursing note and vitals reviewed. ASSESSMENT and PLAN  Diagnoses and all orders for this visit:    1. Alzheimer's disease of other onset without behavioral disturbance    2. Well adult health check -- form completed for Adult day program    3. Screening examination for pulmonary tuberculosis -- PPD placed and will return to office in 48-72 hours to have this read  -     AMB POC TUBERCULOSIS, INTRADERMAL (SKIN TEST)    4.  Essential hypertension -- stable      lab results and schedule of future lab studies reviewed with patient  reviewed diet, exercise and weight control  reviewed medications and side effects in detail

## 2017-11-15 LAB
MM INDURATION POC: 0 MM (ref 0–5)
PPD POC: NORMAL NEGATIVE

## 2018-03-20 RX ORDER — METOPROLOL SUCCINATE 25 MG/1
TABLET, EXTENDED RELEASE ORAL
Qty: 90 TAB | Refills: 1 | Status: SHIPPED | OUTPATIENT
Start: 2018-03-20 | End: 2018-09-01

## 2018-06-19 ENCOUNTER — HOSPITAL ENCOUNTER (EMERGENCY)
Age: 78
Discharge: HOME | End: 2018-06-19
Attending: EMERGENCY MEDICINE
Payer: MEDICARE

## 2018-06-19 ENCOUNTER — HOSPITAL ENCOUNTER (EMERGENCY)
Dept: GENERAL RADIOLOGY | Age: 78
Discharge: HOME | End: 2018-06-19
Attending: EMERGENCY MEDICINE
Payer: MEDICARE

## 2018-06-19 ENCOUNTER — HOSPITAL ENCOUNTER (EMERGENCY)
Dept: GENERAL RADIOLOGY | Age: 78
End: 2018-06-19
Attending: EMERGENCY MEDICINE
Payer: MEDICARE

## 2018-06-19 ENCOUNTER — APPOINTMENT (OUTPATIENT)
Dept: GENERAL RADIOLOGY | Age: 78
End: 2018-06-19
Attending: EMERGENCY MEDICINE
Payer: MEDICARE

## 2018-06-19 ENCOUNTER — APPOINTMENT (OUTPATIENT)
Dept: CT IMAGING | Age: 78
End: 2018-06-19
Attending: EMERGENCY MEDICINE
Payer: MEDICARE

## 2018-06-19 VITALS
SYSTOLIC BLOOD PRESSURE: 147 MMHG | RESPIRATION RATE: 17 BRPM | HEIGHT: 64 IN | BODY MASS INDEX: 20.14 KG/M2 | DIASTOLIC BLOOD PRESSURE: 60 MMHG | TEMPERATURE: 97.4 F | HEART RATE: 47 BPM | WEIGHT: 118 LBS | OXYGEN SATURATION: 100 %

## 2018-06-19 DIAGNOSIS — M25.532 LEFT WRIST PAIN: ICD-10-CM

## 2018-06-19 DIAGNOSIS — S01.01XA LACERATION OF SCALP, INITIAL ENCOUNTER: ICD-10-CM
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
## 2018-06-19 PROCEDURE — 77030008027

## 2018-06-19 PROCEDURE — 72125 CT NECK SPINE W/O DYE: CPT

## 2018-06-19 PROCEDURE — 72126 CT NECK SPINE W/DYE: CPT

## 2018-06-19 PROCEDURE — L3809 WHFO W/O JOINTS PRE OTS: HCPCS

## 2018-06-19 PROCEDURE — 75810000293 HC SIMP/SUPERF WND  RPR

## 2018-06-19 PROCEDURE — 77030018836 HC SOL IRR NACL ICUM -A

## 2018-06-19 PROCEDURE — 73110 X-RAY EXAM OF WRIST: CPT

## 2018-06-19 PROCEDURE — 77030008460 HC STPLR SKN PRECIS 3M -A

## 2018-06-19 PROCEDURE — 70450 CT HEAD/BRAIN W/O DYE: CPT

## 2018-06-19 PROCEDURE — 99283 EMERGENCY DEPT VISIT LOW MDM: CPT

## 2018-06-19 PROCEDURE — 74011000250 HC RX REV CODE- 250: Performed by: EMERGENCY MEDICINE

## 2018-06-19 RX ORDER — BACITRACIN 500 [USP'U]/G
OINTMENT TOPICAL 3 TIMES DAILY
Status: SHIPPED | COMMUNITY
Start: 2018-06-19 | End: 2018-09-06 | Stop reason: ALTCHOICE

## 2018-06-19 RX ORDER — LIDOCAINE HYDROCHLORIDE AND EPINEPHRINE 10; 10 MG/ML; UG/ML
1.5 INJECTION, SOLUTION INFILTRATION; PERINEURAL ONCE
Status: COMPLETED | OUTPATIENT
Start: 2018-06-19 | End: 2018-06-19

## 2018-06-19 RX ORDER — DONEPEZIL HYDROCHLORIDE 10 MG/1
10 TABLET, FILM COATED ORAL DAILY
COMMUNITY
End: 2018-12-17 | Stop reason: ALTCHOICE

## 2018-06-19 RX ORDER — BACITRACIN 500 UNIT/G
1 PACKET (EA) TOPICAL
Status: COMPLETED | OUTPATIENT
Start: 2018-06-19 | End: 2018-06-19

## 2018-06-19 RX ADMIN — BACITRACIN 1 PACKET: 500 OINTMENT TOPICAL at 04:49

## 2018-06-19 RX ADMIN — LIDOCAINE HYDROCHLORIDE,EPINEPHRINE BITARTRATE 15 MG: 10; .01 INJECTION, SOLUTION INFILTRATION; PERINEURAL at 04:49

## 2018-06-19 NOTE — ED NOTES
Suture Cart with supplies set-up outside of the treatment area. Pt resting with her eyes closed and in a position of comfort. Bleeding controlled to head injury. C-collar remains in place. Spouse at bedside. Will continue to monitor.

## 2018-06-19 NOTE — ED TRIAGE NOTES
Pt presents via EMS with c/o left arm pain and head laceration secondary to GLF. Pt with hx of Alzheimer's and was wandering through the house and lost her balance.  denies LOC. Pt ambulatory on scene. C-collar in place upon arrival the the ED.

## 2018-06-19 NOTE — DISCHARGE INSTRUCTIONS
Preventing Falls: Care Instructions  Your Care Instructions    Getting around your home safely can be a challenge if you have injuries or health problems that make it easy for you to fall. Loose rugs and furniture in walkways are among the dangers for many older people who have problems walking or who have poor eyesight. People who have conditions such as arthritis, osteoporosis, or dementia also have to be careful not to fall. You can make your home safer with a few simple measures. Follow-up care is a key part of your treatment and safety. Be sure to make and go to all appointments, and call your doctor if you are having problems. It's also a good idea to know your test results and keep a list of the medicines you take. How can you care for yourself at home? Taking care of yourself  · You may get dizzy if you do not drink enough water. To prevent dehydration, drink plenty of fluids, enough so that your urine is light yellow or clear like water. Choose water and other caffeine-free clear liquids. If you have kidney, heart, or liver disease and have to limit fluids, talk with your doctor before you increase the amount of fluids you drink. · Exercise regularly to improve your strength, muscle tone, and balance. Walk if you can. Swimming may be a good choice if you cannot walk easily. · Have your vision and hearing checked each year or any time you notice a change. If you have trouble seeing and hearing, you might not be able to avoid objects and could lose your balance. · Know the side effects of the medicines you take. Ask your doctor or pharmacist whether the medicines you take can affect your balance. Sleeping pills or sedatives can affect your balance. · Limit the amount of alcohol you drink. Alcohol can impair your balance and other senses. · Ask your doctor whether calluses or corns on your feet need to be removed.  If you wear loose-fitting shoes because of calluses or corns, you can lose your balance and fall. · Talk to your doctor if you have numbness in your feet. Preventing falls at home  · Remove raised doorway thresholds, throw rugs, and clutter. Repair loose carpet or raised areas in the floor. · Move furniture and electrical cords to keep them out of walking paths. · Use nonskid floor wax, and wipe up spills right away, especially on ceramic tile floors. · If you use a walker or cane, put rubber tips on it. If you use crutches, clean the bottoms of them regularly with an abrasive pad, such as steel wool. · Keep your house well lit, especially [place], and outside walkways. Use night-lights in areas such as hallways and bathrooms. Add extra light switches or use remote switches (such as switches that go on or off when you clap your hands) to make it easier to turn lights on if you have to get up during the night. · Install sturdy handrails on stairways. · Move items in your cabinets so that the things you use a lot are on the lower shelves (about waist level). · Keep a cordless phone and a flashlight with new batteries by your bed. If possible, put a phone in each of the main rooms of your house, or carry a cell phone in case you fall and cannot reach a phone. Or, you can wear a device around your neck or wrist. You push a button that sends a signal for help. · Wear low-heeled shoes that fit well and give your feet good support. Use footwear with nonskid soles. Check the heels and soles of your shoes for wear. Repair or replace worn heels or soles. · Do not wear socks without shoes on wood floors. · Walk on the grass when the sidewalks are slippery. If you live in an area that gets snow and ice in the winter, sprinkle salt on slippery steps and sidewalks. Preventing falls in the bath  · Install grab bars and nonskid mats inside and outside your shower or tub and near the toilet and sinks. · Use shower chairs and bath benches.   · Use a hand-held shower head that will allow you to sit while showering. · Get into a tub or shower by putting the weaker leg in first. Get out of a tub or shower with your strong side first.  · Repair loose toilet seats and consider installing a raised toilet seat to make getting on and off the toilet easier. · Keep your bathroom door unlocked while you are in the shower. Where can you learn more? Go to http://pipo-vaughn.info/. Enter [id number] [id number] in the search box to learn more about \"Preventing Falls: Care Instructions. \"  Current as of: May 12, 2017  Content Version: 11.4  © 4784-9350 [employer]. Care instructions adapted under license by [employer] (which disclaims liability or warranty for this information). If you have questions about a medical condition or this instruction, always ask your healthcare professional. [place] [address] any warranty or liability for your use of this information. Cuts Closed With Staples: Care Instructions  Your Care Instructions  A cut can happen anywhere on your body. The doctor used staples to close the cut. Staples easily and quickly close a cut, which helps the cut heal.  Sometimes a cut can injure tendons, blood vessels, or nerves. If the cut went deep and through the skin, the doctor may have put in a layer of stitches below the staples. The deeper layer of stitches brings the deep part of the cut together. These stitches will dissolve and don't need to be removed. The staples in the upper layer are what you see on the cut. You may have a bandage. You will need to have the staples removed, usually in 7 to 14 days. The doctor has checked you carefully, but problems can develop later. If you notice any problems or new symptoms, get medical treatment right away. Follow-up care is a key part of your treatment and safety. Be sure to make and go to all appointments, and call your doctor if you are having problems.  It's also a good idea to know your test results and keep a list of the medicines you take. How can you care for yourself at home? · Keep the cut dry for the first 24 to 48 hours. After this, you can shower if your doctor okays it. Pat the cut dry. · Don't soak the cut, such as in a bathtub. Your doctor will tell you when it's safe to get the cut wet. · If your doctor told you how to care for your cut, follow your doctor's instructions. If you did not get instructions, follow this general advice:  ¨ After the first 24 to 48 hours, wash around the cut with clean water 2 times a day. Don't use hydrogen peroxide or alcohol, which can slow healing. ¨ You may cover the cut with a thin layer of petroleum jelly, such as Vaseline, and a nonstick bandage. ¨ Apply more petroleum jelly and replace the bandage as needed. · Avoid any activity that could cause your cut to reopen. · Do not remove the staples on your own. Your doctor will tell you when to come back to have the staples removed. · Take pain medicines exactly as directed. ¨ If the doctor gave you a prescription medicine for pain, take it as prescribed. ¨ If you are not taking a prescription pain medicine, ask your doctor if you can take an over-the-counter medicine. When should you call for help? Call your doctor now or seek immediate medical care if:  ? · You have new pain, or your pain gets worse. ? · The skin near the cut is cold or pale or changes color. ? · You have tingling, weakness, or numbness near the cut.   ? · The cut starts to bleed, and blood soaks through the bandage. Oozing small amounts of blood is normal.   ? · You have trouble moving the area near the cut.   ? · You have symptoms of infection, such as:  ¨ Increased pain, swelling, warmth, or redness around the cut. ¨ Red streaks leading from the cut. ¨ Pus draining from the cut. ¨ A fever. ? Watch closely for changes in your health, and be sure to contact your doctor if:  ? · You do not get better as expected.    Where can you learn more? Go to http://pipo-vaughn.info/. Enter D788 in the search box to learn more about \"Cuts Closed With Staples: Care Instructions. \"  Current as of: March 20, 2017  Content Version: 11.4  © 3454-3513 [employer], "[employer]. Care instructions adapted under license by [employer] (which disclaims liability or warranty for this information). If you have questions about a medical condition or this instruction, always ask your healthcare professional. [address] any warranty or liability for your use of this information.

## 2018-06-19 NOTE — ED PROVIDER NOTES
HPI Comments: Diamante Tucker is a 79 yo F with history of hypertension and alzheimer's dementia who presents to the ED with head wound and left forearm pain after a fall. Her  states that he was sleeping downstairs in an arm chair because he recently had surgery on his scalp. He woke to his wife calling out and looked up and saw as she was falling to the floor between the sofa and glass topped coffee table. She hit her head on the table but did not pass out. He helped her up and took her to the bathroom. He noticed a cut on the back of her head but was concerned that it was not bleeding very much and he worried that she may have subdural bleeding and so brought her to the ED for evaluation. Past Medical History:   Diagnosis Date    Alzheimer's dementia     mild-moderate. clinical trial 6/2017-6/2017 [facility] Lumbar stenosis 2011    surgery 2011    Normal cardiac stress test 07/2017    PVCs (premature ventricular contractions)     Retinal disease     Dr. Isabelle Cat    Subdural hematoma [facility])        Past Surgical History:   Procedure Laterality Date    HX HEENT      vitrectomy    HX LUMBAR LAMINECTOMY  2011    HX ORTHOPAEDIC      hammertoe repair    HX TUBAL LIGATION      HX UROLOGICAL      bladder suspension         Family History:   Problem Relation Age of Onset    Lung Disease Mother     Hypertension Mother     Lung Disease Father    Estrada Ban Arthritis-osteo Sister     No Known Problems Brother     No Known Problems Daughter     No Known Problems Daughter        Social History     Social History    Marital status:      Spouse name: N/A    Number of children: N/A    Years of education: N/A     Occupational History    Not on file.      Social History Main Topics    Smoking status: Former Smoker    Smokeless tobacco: Never Used    Alcohol use Yes      Comment: occaisional    Drug use: No    Sexual activity: Not on file     Other Topics Concern    Not on file Social History Narrative         ALLERGIES: Oxycodone    Review of Systems   Unable to perform ROS: Dementia   Musculoskeletal: Positive for arthralgias (left wrist). Skin: Positive for wound. Vitals:    06/19/18 0431 06/19/18 0558   BP: 155/53 147/60   Pulse: (!) 47    Resp: 20 17   Temp: 97.4 °F (36.3 °C)    SpO2: 99% 100%   Weight: 53.5 kg (118 lb)    Height: 5' 4\" (1.626 m)             Physical Exam   Constitutional: She appears well-developed and well-nourished. No distress. Cervical collar in place. HENT:   Head: Normocephalic. Head is with laceration (left posterior scalp). Mouth/Throat: Oropharynx is clear and moist.   Eyes: Conjunctivae and EOM are normal.   Neck: Normal range of motion and phonation normal.   Cardiovascular: Normal rate and intact distal pulses. Pulmonary/Chest: Effort normal. No respiratory distress. Abdominal: She exhibits no distension. Musculoskeletal: Normal range of motion. Left shoulder: She exhibits normal range of motion and no deformity. Left elbow: She exhibits normal range of motion and no deformity. Left wrist: She exhibits tenderness. She exhibits no effusion and no deformity. Neurological: She is alert. She is not disoriented. She exhibits normal muscle tone. Skin: Skin is warm and dry. Nursing note and vitals reviewed. [facility]      ED Course       Wound Repair  Date/Time: 6/19/2018 5:49 AM  Preparation: skin prepped with Shur-Clens  Pre-procedure re-eval: Immediately prior to the procedure, the patient was reevaluated and found suitable for the planned procedure and any planned medications. Time out: Immediately prior to the procedure a time out was called to verify the correct patient, procedure, equipment, staff and marking as appropriate. .  Location details: scalp  Wound length: 3.5cm.   Anesthesia: local infiltration    Anesthesia:  Local Anesthetic: lidocaine 1% with epinephrine  Anesthetic total: 4 mL  Foreign bodies: no foreign bodies  Debridement: minimal  Skin closure: staples  Number of sutures: 9  Approximation: close  Dressing: antibiotic ointment  Patient tolerance: Patient tolerated the procedure well with no immediate complications  My total time at bedside, performing this procedure was 16-30 minutes. 5:58 AM  CT head and C spine with no acute findings. XR wrist with osteopenia and chronic appearing fracture of distal radius. Patient reexamined and is not tender in area of chronic fracture.

## 2018-06-19 NOTE — ED NOTES
Wound repair completed and bacitracin applies; pt tolerated well. Spouse educated on cleaning staples and when to have them removed. Pt ambulatory in the department with spouse assist; gait slow and steady. The patient was discharged home by  in stable condition. The patient is alert , in no respiratory distress and discharge vital signs obtained. The patient's diagnosis, condition and treatment were explained. The spouse expressed understanding. No prescriptions given. No work/school note given. A discharge plan has been developed. A  was not involved in the process. Aftercare instructions were given. Pt discharged from the ED via w/c by   SCARLET Andre with family.

## 2018-06-27 ENCOUNTER — OFFICE VISIT (OUTPATIENT)
Dept: INTERNAL MEDICINE CLINIC | Age: 78
End: 2018-06-27

## 2018-06-27 VITALS
DIASTOLIC BLOOD PRESSURE: 82 MMHG | HEART RATE: 64 BPM | BODY MASS INDEX: 20.49 KG/M2 | HEIGHT: 64 IN | SYSTOLIC BLOOD PRESSURE: 138 MMHG | WEIGHT: 120 LBS | OXYGEN SATURATION: 95 % | RESPIRATION RATE: 18 BRPM

## 2018-06-27 DIAGNOSIS — G30.8 ALZHEIMER'S DISEASE OF OTHER ONSET WITHOUT BEHAVIORAL DISTURBANCE: ICD-10-CM

## 2018-06-27 DIAGNOSIS — F02.80 ALZHEIMER'S DISEASE OF OTHER ONSET WITHOUT BEHAVIORAL DISTURBANCE: ICD-10-CM

## 2018-06-27 DIAGNOSIS — I10 ESSENTIAL HYPERTENSION: ICD-10-CM

## 2018-06-27 DIAGNOSIS — S01.01XS: Primary | ICD-10-CM

## 2018-06-27 RX ORDER — TRAZODONE HYDROCHLORIDE 50 MG/1
TABLET ORAL
Refills: 3 | COMMUNITY
Start: 2018-03-20 | End: 2018-12-17 | Stop reason: ALTCHOICE

## 2018-06-27 RX ORDER — DEXTROMETHORPHAN HYDROBROMIDE, GUAIFENESIN 5; 100 MG/5ML; MG/5ML
650 LIQUID ORAL EVERY 8 HOURS
Qty: 60 TAB | Refills: 5
Start: 2018-06-27 | End: 2018-12-17 | Stop reason: ALTCHOICE

## 2018-06-28 NOTE — PROGRESS NOTES
HISTORY OF PRESENT ILLNESS  She is with her daughter . Hx limited by dementia  Presents with scalp laceraton 6/19.18. Associated symptoms include: fell down at night   Treatments tried include: medication not used    Hypertension  Hypertension ROS: taking medications as instructed, no medication side effects noted, no TIA's, no chest pain on exertion, no dyspnea on exertion, no swelling of ankles     reports that she has quit smoking. She has never used smokeless tobacco.    reports that she drinks alcohol. BP Readings from Last 2 Encounters:   06/27/18 138/82   06/19/18 147/60         Review of Systems   All other systems reviewed and are negative, except as noted in HPI    Past Medical and Surgical History   has a past medical history of Alzheimer's dementia; Lumbar stenosis (2011); Normal cardiac stress test (07/2017); PVCs (premature ventricular contractions); Retinal disease; and Subdural hematoma ([facility] [address] (2012). has a past surgical history that includes hx lumbar laminectomy (2011); hx orthopaedic; hx heent; hx tubal ligation; and hx urological.     reports that she has quit smoking. She has never used smokeless tobacco. She reports that she drinks alcohol. She reports that she does not use illicit drugs. family history includes Arthritis-osteo in her sister; Hypertension in her mother; Lung Disease in her father and mother; No Known Problems in her brother, daughter, and daughter. Physical Exam   Nursing note and vitals reviewed. Blood pressure 138/82, pulse 64, resp. rate 18, height 5' 4\" (1.626 m), weight 120 lb (54.4 kg), SpO2 95 %. Constitutional: In no distress. Eyes: Conjunctivae are normal.  HEENT:  No LAD or thyromegaly   Cardiovascular: Normal rate. regular rhythm. No murmurs  No edema  Pulmonary/Chest: Effort normal. clear to ausculation blaterally  Musculoskeletal:  no edema. Abd:  Neurological: Alert and oriented. Grossly intact cranial nerves and motor function.      Skin: No rash noted. Psychiatric: oriented to person only  3 cm healed laceration w 9 stables  Left posterior scalp. ASSESSMENT and PLAN  Diagnoses and all orders for this visit:    1. Laceration of scalp with complication, sequela    2. Essential hypertension    3. Alzheimer's disease of other onset without behavioral disturbance    Other orders  -     acetaminophen (TYLENOL ARTHRITIS PAIN) 650 mg TbER; Take 1 Tab by mouth every eight (8) hours. lab results and schedule of future lab studies reviewed with patient  reviewed medications and side effects in detail    Return to clinic for further evaluation if new symptoms develop or if current symptoms worsen or fail to resolve. There are no Patient Instructions on file for this visit.

## 2018-07-09 ENCOUNTER — TELEPHONE (OUTPATIENT)
Dept: INTERNAL MEDICINE CLINIC | Age: 78
End: 2018-07-09

## 2018-07-09 NOTE — TELEPHONE ENCOUNTER
Per  , pt fell face first yesterday running on a trail , bruised, some discomfort in ribs , able to take deep breaths without pain ,no appointments available, I suggested ER or [facility], staples  Were just removed from head per  by PCP. Kendell Segundo

## 2018-07-09 NOTE — TELEPHONE ENCOUNTER
Per  will monitor bruises and pain, using ice at this time to reduce swelling , will use heat starting Tuesday, pt did not lose consciousness per  , if new symptoms will make appointment .

## 2018-07-11 ENCOUNTER — OFFICE VISIT (OUTPATIENT)
Dept: INTERNAL MEDICINE CLINIC | Age: 78
End: 2018-07-11

## 2018-07-11 ENCOUNTER — HOSPITAL ENCOUNTER (OUTPATIENT)
Dept: GENERAL RADIOLOGY | Age: 78
Discharge: HOME | End: 2018-07-11
Attending: INTERNAL MEDICINE
Payer: MEDICARE

## 2018-07-11 VITALS
TEMPERATURE: 98.3 F | SYSTOLIC BLOOD PRESSURE: 124 MMHG | BODY MASS INDEX: 20.59 KG/M2 | RESPIRATION RATE: 14 BRPM | DIASTOLIC BLOOD PRESSURE: 74 MMHG | WEIGHT: 120.6 LBS | HEIGHT: 64 IN | HEART RATE: 70 BPM | OXYGEN SATURATION: 97 %

## 2018-07-11 DIAGNOSIS — R07.81 RIB PAIN ON RIGHT SIDE: ICD-10-CM

## 2018-07-11 DIAGNOSIS — S63.502A SPRAIN OF LEFT WRIST, INITIAL ENCOUNTER: ICD-10-CM

## 2018-07-11 DIAGNOSIS — S52.592A OTHER CLOSED FRACTURE OF DISTAL END OF LEFT RADIUS, INITIAL ENCOUNTER: Primary | ICD-10-CM

## 2018-07-11 DIAGNOSIS — S22.31XA CLOSED FRACTURE OF ONE RIB OF RIGHT SIDE, INITIAL ENCOUNTER: ICD-10-CM

## 2018-07-11 PROCEDURE — 73110 X-RAY EXAM OF WRIST: CPT

## 2018-07-11 PROCEDURE — 71101 X-RAY EXAM UNILAT RIBS/CHEST: CPT

## 2018-07-11 RX ORDER — IBUPROFEN 200 MG
600 TABLET ORAL
Qty: 28 TAB | Refills: 0
Start: 2018-07-11 | End: 2018-07-14

## 2018-07-11 NOTE — PROGRESS NOTES
HISTORY OF PRESENT ILLNESS    Presents with her . She is a limited historian due to dementia. Patient was walking on a trail with her  and family on July 8 and decided to start running. She ran down a hill and quickly fell down and landed hard on the pavement. Reports an injury to her chin, right chest wall, left wrist, mild abrasions of the legs. She was able to get up on her own. Denies any head injury or loss of consciousness. Since that time, she has been complaining about worsening left wrist pain. She had some mild wrist pain from fall 1 month ago and an x-ray that showed an old radial fracture. She has difficulty extending or flexing her wrist at this time. She also complains of relatively significant right lateral lower chest wall pain. Hurts to push on it. No bruising in this area. Review of Systems   All other systems reviewed and are negative, except as noted in HPI    Past Medical and Surgical History   has a past medical history of Alzheimer's dementia; Lumbar stenosis (2011); Normal cardiac stress test (07/2017); PVCs (premature ventricular contractions); Retinal disease; and Subdural hematoma ([facility] [address] (2012). has a past surgical history that includes hx lumbar laminectomy (2011); hx orthopaedic; hx heent; hx tubal ligation; and hx urological.     reports that she has quit smoking. She has never used smokeless tobacco. She reports that she drinks alcohol. She reports that she does not use illicit drugs. family history includes Arthritis-osteo in her sister; Hypertension in her mother; Lung Disease in her father and mother; No Known Problems in her brother, daughter, and daughter. Physical Exam   Nursing note and vitals reviewed. Blood pressure 124/74, pulse 70, temperature 98.3 °F (36.8 °C), temperature source Oral, resp. rate 14, height 5' 4\" (1.626 m), weight 120 lb 9.6 oz (54.7 kg), SpO2 97 %. Constitutional: In no distress. Mild abrasion of her chin.   Normal dentition. Eyes: Conjunctivae are normal.  HEENT:  No LAD or thyromegaly   Cardiovascular: Normal rate. regular rhythm. No murmurs  No edema    Pulmonary/Chest: Effort normal. clear to ausculation blaterally  Musculoskeletal:  no edema. Left wrist with moderate edema. Limited flexion and pain with passive flexion or extension. Right lower chest wall with significant tenderness with palpation on her lower ribs. Left chest wall normal  She is able to get up with mild pain  Abd:  Neurological: Alert  but tangential.  Grossly intact cranial nerves and motor function. Skin: No rash noted. Psychiatric: Normal mood and affect. Behavior is normal.     ASSESSMENT and PLAN  Diagnoses and all orders for this visit:    1. Other closed fracture of distal end of left radius, initial encounter   2. Sprain of left wrist, initial encounter  repeat x-ray today shows a acute distal radial fracture. Recommend that she go to orthopedics for a splint. She may need surgery if it is in the joint space somewhat. -       XR WRIST LT AP/LAT/OBL MIN 3V; Future    3. Rib pain on right side  4. Closed fracture of one rib of right side, initial encounter  Acute right rib 6 fracture. Advised to give it time, did not lie on that side. Trial of ibuprofen scheduled as below for both issues. -     ibuprofen (ADVIL) 200 mg tablet; Take 3 Tabs by mouth every eight (8) hours as needed for Pain (then take as needed) for up to 3 days. lab results and schedule of future lab studies reviewed with patient  reviewed medications and side effects in detail    Return to clinic for further evaluation if new symptoms develop or if current symptoms worsen or fail to resolve. There are no Patient Instructions on file for this visit.

## 2018-07-11 NOTE — MR AVS SNAPSHOT
[address] [facility] 
 
 
 [address] [place] [address] [facility] 
[phone] Patient: Kourtney Graff MRN: [id number] UEQ:9/2/6316 Visit Information Date & Time Provider Department Dept. Phone Encounter #  
 7/11/2018 11:30 AM Ulises Warner MD [facility] [address] [facility] [id number] Your Appointments 8/8/2018 10:00 AM  
Medicare Physical with Ulises Warner MD  
[facility] [address]) Appt Note: mwa  
 [address] [facility] [address]  
[phone]  
  
   
 [address] [facility] [address] Upcoming Health Maintenance Date Due DTaP/Tdap/Td series (1 - Tdap) 8/3/1963 ZOSTER VACCINE AGE 60> 6/3/2002 GLAUCOMA SCREENING Q2Y 8/3/2007 Bone Densitometry (Dexa) Screening 8/3/2007 Pneumococcal 65+ Low/Medium Risk (1 of 2 - PCV13) 8/3/2007 MEDICARE YEARLY EXAM 6/19/2018 Influenza Age 5 to Adult 8/1/2018 Allergies as of 7/11/2018  Review Complete On: 6/27/2018 By: Ulises Warner MD  
  
 Severity Noted Reaction Type Reactions Oxycodone  09/23/2017    Other (comments) psychosis Current Immunizations  Reviewed on 11/13/2017 Name Date  
 TB Skin Test (PPD) Intradermal 11/13/2017 Not reviewed this visit You Were Diagnosed With   
  
 Codes Comments Sprain of left wrist, initial encounter    -  Primary ICD-10-CM: C78.704Y ICD-9-CM: 842.00 Rib pain on right side     ICD-10-CM: R07.81 ICD-9-CM: 786.50 Vitals BP Pulse Temp Resp Height(growth percentile) Weight(growth percentile) 124/74 (BP 1 Location: Left arm, BP Patient Position: Sitting) 70 98.3 °F (36.8 °C) (Oral) 14 5' 4\" (1.626 m) 120 lb 9.6 oz (54.7 kg) SpO2 BMI OB Status Smoking Status 97% 20.7 kg/m2 Postmenopausal Former Smoker Vitals History BMI and BSA Data Body Mass Index Body Surface Area  20.7 kg/m 2 1.57 m 2  
  
  
 Preferred Pharmacy Pharmacy Name Phone [id number] [place], [address] [place] [address]) [phone] Your Updated Medication List  
  
   
This list is accurate as of 7/11/18 12:41 PM.  Always use your most recent med list.  
  
  
  
  
 acetaminophen 650 mg Tber Commonly known as:  TYLENOL ARTHRITIS PAIN Take 1 Tab by mouth every eight (8) hours. ARICEPT 10 mg tablet Generic drug:  donepezil Take 10 mg by mouth nightly. ASPIR-81 PO Take 81 mg by mouth daily. bacitracin 500 unit/gram Oint Commonly known as:  BACITRACIN Apply  to affected area three (3) times daily. Apply to affected area  
  
 ibuprofen 200 mg tablet Commonly known as:  ADVIL Take 3 Tabs by mouth every eight (8) hours as needed for Pain (then take as needed) for up to 3 days. metoprolol succinate 25 mg XL tablet Commonly known as:  TOPROL-XL  
TAKE 1 TABLET EVERY DAY  
  
 NAMENDA XR 28 mg capsule Generic drug:  memantine ER Take  by mouth daily. PROBIOTIC PO Take 1 Cap by mouth daily. traZODone 50 mg tablet Commonly known as:  DESYREL  
TAKE 1/2 TABLET BY MOUTH EVERY 8 HOURS AS NEEDED FOR AGITATION. To-Do List   
 07/11/2018 Imaging:  XR RIBS RT UNI 2 V   
  
 07/11/2018 Imaging:  XR WRIST LT AP/LAT/OBL MIN 3V Introducing [facility]! Leonard Hernandez introduces [employer] patient portal. Now you can access parts of your medical record, email your doctor's office, and request medication refills online. 1. In your internet browser, go to [email or web address] 2. Click on the First Time User? Click Here link in the Sign In box. You will see the New Member Sign Up page. 3. Enter your [employer] Access Code exactly as it appears below. You will not need to use this code after youve completed the sign-up process.  If you do not sign up before the expiration date, you must request a new code. · [employer] Access Code: [email or web address] Expires: 10/6/2018  5:21 AM 
 
4. Enter the last four digits of your Social Security Number (xxxx) and Date of Birth (mm/dd/yyyy) as indicated and click Submit. You will be taken to the next sign-up page. 5. Create a [employer] ID. This will be your [employer] login ID and cannot be changed, so think of one that is secure and easy to remember. 6. Create a [employer] password. You can change your password at any time. 7. Enter your Password Reset Question and Answer. This can be used at a later time if you forget your password. 8. Enter your e-mail address. You will receive e-mail notification when new information is available in [address] 9. Click Sign Up. You can now view and download portions of your medical record. 10. Click the Download Summary menu link to download a portable copy of your medical information. If you have questions, please visit the Frequently Asked Questions section of the [employer] website. Remember, [employer] is NOT to be used for urgent needs. For medical emergencies, dial 911. Now available from your iPhone and Android! Please provide this summary of care documentation to your next provider. Your primary care clinician is listed as Yenifer Zuniga. If you have any questions after today's visit, please call [phone].

## 2018-09-06 ENCOUNTER — OFFICE VISIT (OUTPATIENT)
Dept: INTERNAL MEDICINE CLINIC | Age: 78
End: 2018-09-06

## 2018-09-06 VITALS
WEIGHT: 113 LBS | RESPIRATION RATE: 18 BRPM | SYSTOLIC BLOOD PRESSURE: 148 MMHG | HEART RATE: 55 BPM | OXYGEN SATURATION: 97 % | HEIGHT: 64 IN | TEMPERATURE: 98.2 F | DIASTOLIC BLOOD PRESSURE: 79 MMHG | BODY MASS INDEX: 19.29 KG/M2

## 2018-09-06 DIAGNOSIS — R00.1 BRADYCARDIA: ICD-10-CM

## 2018-09-06 DIAGNOSIS — H61.21 CERUMINOSIS, RIGHT: Primary | ICD-10-CM

## 2018-09-06 DIAGNOSIS — G89.29 CHRONIC BILATERAL LOW BACK PAIN, WITH SCIATICA PRESENCE UNSPECIFIED: ICD-10-CM

## 2018-09-06 DIAGNOSIS — M54.5 CHRONIC BILATERAL LOW BACK PAIN, WITH SCIATICA PRESENCE UNSPECIFIED: ICD-10-CM

## 2018-09-06 DIAGNOSIS — I10 ESSENTIAL HYPERTENSION: ICD-10-CM

## 2018-09-06 DIAGNOSIS — G30.8 ALZHEIMER'S DISEASE OF OTHER ONSET WITHOUT BEHAVIORAL DISTURBANCE: ICD-10-CM

## 2018-09-06 DIAGNOSIS — Z87.898 HISTORY OF SYNCOPE: ICD-10-CM

## 2018-09-06 DIAGNOSIS — F02.80 ALZHEIMER'S DISEASE OF OTHER ONSET WITHOUT BEHAVIORAL DISTURBANCE: ICD-10-CM

## 2018-09-06 RX ORDER — METOPROLOL SUCCINATE 25 MG/1
TABLET, EXTENDED RELEASE ORAL
Refills: 1 | COMMUNITY
Start: 2018-08-25 | End: 2018-09-06 | Stop reason: SDUPTHER

## 2018-09-06 RX ORDER — METOPROLOL SUCCINATE 25 MG/1
TABLET, EXTENDED RELEASE ORAL
Qty: 30 TAB | Refills: 1
Start: 2018-09-06 | End: 2018-12-17 | Stop reason: ALTCHOICE

## 2018-09-06 RX ORDER — LOSARTAN POTASSIUM 25 MG/1
25 TABLET ORAL DAILY
Qty: 30 TAB | Refills: 1 | Status: SHIPPED | OUTPATIENT
Start: 2018-09-06 | End: 2018-11-08 | Stop reason: SDUPTHER

## 2018-09-06 RX ORDER — NAPROXEN SODIUM 220 MG
440 TABLET ORAL 2 TIMES DAILY WITH MEALS
Qty: 100 TAB | Refills: 0
Start: 2018-09-06 | End: 2018-12-17 | Stop reason: ALTCHOICE

## 2018-09-06 NOTE — MR AVS SNAPSHOT
303 [facility] 
 
 
 [address] [facility] [address] [facility] 
[phone] Patient: Jhonatan Sotelo MRN: [id number] CBT:9/4/[birth year]45 Visit Information Date & Time Provider Department Dept. Phone Encounter #  
 9/6/2018  3:45 PM Darrell Amaro MD [facility] [facility] [id number] Your Appointments 11/21/2018  9:00 AM  
Complete Physical with Darrell Amaro MD  
[facility] Appt Note: cpe $0cp lmj 08/21/2018 [address] [facility] [address]  
[phone]  
  
   
 [address] [facility] [address] Upcoming Health Maintenance Date Due DTaP/Tdap/Td series (1 - Tdap) 8/3/1963 ZOSTER VACCINE AGE 60> 6/3/2002 GLAUCOMA SCREENING Q2Y 8/3/2007 Bone Densitometry (Dexa) Screening 8/3/2007 Pneumococcal 65+ Low/Medium Risk (1 of 2 - PCV13) 8/3/2007 MEDICARE YEARLY EXAM 6/19/2018 Influenza Age 5 to Adult 8/1/2018 Allergies as of 9/6/2018  Review Complete On: 9/6/2018 By: Reagan Rojas LPN Severity Noted Reaction Type Reactions Oxycodone  09/23/2017    Other (comments) psychosis Current Immunizations  Reviewed on 11/13/2017 Name Date  
 TB Skin Test (PPD) Intradermal 11/13/2017 Not reviewed this visit You Were Diagnosed With   
  
 Codes Comments Essential hypertension    -  Primary ICD-10-CM: I10 
ICD-9-CM: 401.9 Bradycardia     ICD-10-CM: R00.1 ICD-9-CM: 427.89 History of syncope     ICD-10-CM: Z87.898 ICD-9-CM: V15.89 Ceruminosis, right     ICD-10-CM: H61.21 ICD-9-CM: 380.4 Alzheimer's disease of other onset without behavioral disturbance     ICD-10-CM: G30.8, F02.80 ICD-9-CM: 331.0, 294.10 Chronic bilateral low back pain, with sciatica presence unspecified     ICD-10-CM: M54.5, G89.29 ICD-9-CM: 724.2, 338.29 Vitals BP Pulse Temp Resp Height(growth percentile) Weight(growth percentile) 148/79 (BP 1 Location: Left arm, BP Patient Position: Sitting) (!) 55 98.2 °F (36.8 °C) (Oral) 18 5' 4\" (1.626 m) 113 lb (51.3 kg) SpO2 BMI OB Status Smoking Status 97% 19.4 kg/m2 Postmenopausal Former Smoker Vitals History BMI and BSA Data Body Mass Index Body Surface Area  
 19.4 kg/m 2 1.52 m 2 Preferred Pharmacy Pharmacy Name Phone [id number] [place], [address] [place] [address]) [phone] Your Updated Medication List  
  
   
This list is accurate as of 9/6/18  4:32 PM.  Always use your most recent med list.  
  
  
  
  
 acetaminophen 650 mg Tber Commonly known as:  TYLENOL ARTHRITIS PAIN Take 1 Tab by mouth every eight (8) hours. ARICEPT 10 mg tablet Generic drug:  donepezil Take 10 mg by mouth daily. ASPIR-81 PO Take 81 mg by mouth daily. clonazePAM 0.5 mg tablet Commonly known as:  Nona Broaden Take 0.5 mg by mouth nightly as needed. losartan 25 mg tablet Commonly known as:  COZAAR Take 1 Tab by mouth daily. Indications: hypertension  
  
 memantine ER 28 mg capsule Commonly known as:  NAMENDA XR  
TAKE 1 CAPSULE BY MOUTH EVERY DAY  
  
 metoprolol succinate 25 mg XL tablet Commonly known as:  TOPROL-XL Take 1/2 pill daily for 1 week, then stop  Indications: hypertension  
  
 naproxen sodium 220 mg tablet Commonly known as:  Rashid Najjar Take 2 Tabs by mouth two (2) times daily (with meals). PROBIOTIC PO Take 1 Cap by mouth daily. traZODone 50 mg tablet Commonly known as:  DESYREL  
TAKE 1/2 TABLET BY MOUTH EVERY 8 HOURS AS NEEDED FOR AGITATION. Prescriptions Sent to Pharmacy Refills  
 losartan (COZAAR) 25 mg tablet 1 Sig: Take 1 Tab by mouth daily. Indications: hypertension  Class: Normal  
 Pharmacy: [id number]- [address], [address] [address]) Ph #: [phone] Route: Oral  
  
Introducing [facility]! Dear Prabhu Powell: Thank you for requesting a [employer] account. Our records indicate that you already have an active [employer] account. You can access your account anytime at [email or web address] Did you know that you can access your hospital and ER discharge instructions at any time in [employer]? You can also review all of your test results from your hospital stay or ER visit. Additional Information If you have questions, please visit the Frequently Asked Questions section of the [employer] website at [email or web address] Remember, [employer] is NOT to be used for urgent needs. For medical emergencies, dial 911. Now available from your iPhone and Android! Please provide this summary of care documentation to your next provider. Your primary care clinician is listed as Renard Feldman. If you have any questions after today's visit, please call [phone].

## 2018-09-07 PROBLEM — H61.21 CERUMINOSIS, RIGHT: Status: ACTIVE | Noted: 2018-09-07

## 2018-09-07 PROBLEM — G89.29 CHRONIC BILATERAL LOW BACK PAIN: Status: ACTIVE | Noted: 2018-09-07

## 2018-09-07 PROBLEM — M54.50 CHRONIC BILATERAL LOW BACK PAIN: Status: ACTIVE | Noted: 2018-09-07

## 2018-09-07 NOTE — PROGRESS NOTES
HISTORY OF PRESENT ILLNESS    Presents with her , Wilmar Blackburn. Patient is a very poor historian due to dementia. She is often nonsensical in her replies. Patient has had recurrent falls. She was seen in the emergency room again for a fall and was found to be bradycardic. She was asked to hold her metoprolol, but her  was reluctant to do that because it also controls her blood pressure. On review of records, she does have chronic bradycardia but does take metoprolol.  believes she has some wax in her ear. She has been having difficulty hearing him lately. She denies any ear pain. Blood pressure 148/79, pulse (!) 55, temperature 98.2 °F (36.8 °C), temperature source Oral, resp. rate 18, height 5' 4\" (1.626 m), weight 113 lb (51.3 kg), SpO2 97 %. 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 has a past surgical history that includes hx lumbar laminectomy (2011); hx orthopaedic; hx heent; hx tubal ligation; and hx urological.     reports that she has quit smoking. She has never used smokeless tobacco. She reports that she drinks alcohol. She reports that she does not use illicit drugs. family history includes Arthritis-osteo in her sister; Hypertension in her mother; Lung Disease in her father and mother; No Known Problems in her brother, daughter, and daughter. Physical Exam   Nursing note and vitals reviewed. Blood pressure 148/79, pulse (!) 55, temperature 98.2 °F (36.8 °C), temperature source Oral, resp. rate 18, height 5' 4\" (1.626 m), weight 113 lb (51.3 kg), SpO2 97 %. Constitutional: In no distress.     Eyes: Conjunctivae are normal.  HEENT:  No LAD or thyromegaly Narrow ear canals, complete right cerumen impaction  Cardiovascular: Normal rate. regular rhythm. No murmurs  No edema  Pulmonary/Chest: Effort normal. clear to ausculation blaterally  Musculoskeletal:  no edema. Abd:  Neurological: Alert and oriented. Grossly intact cranial nerves and motor function. Skin: No rash noted. Psychiatric: Very pleasant, nonsensical    ASSESSMENT and PLAN  Diagnoses and all orders for this visit:    1. Ceruminosis, right  Ceruminosis is noted. Wax is removed by syringing and manual debridement. Instructions for home care to prevent wax buildup are given. -     REMOVE IMPACTED EAR WAX    2. Essential hypertension  Blood pressure is borderline controlled. Recurrent falls which are very likely precipitated by bradycardia. Recommend weaning off of metoprolol the next 2 weeks. Instructions as below. Start losartan for hypertension and titrate as needed. -     losartan (COZAAR) 25 mg tablet; Take 1 Tab by mouth daily. Indications: hypertension  -     metoprolol succinate (TOPROL-XL) 25 mg XL tablet; Take 1/2 pill daily for 1 week, then stop  Indications: hypertension    3. Bradycardia    4. History of syncope    5. Alzheimer's disease of other onset without behavioral disturbance   requires full care for safety. .  Physically is doing fairly well other than fall risk. 6. Chronic bilateral low back pain, with sciatica presence unspecified  -     naproxen sodium (ALEVE) 220 mg tablet; Take 2 Tabs by mouth two (2) times daily (with meals). lab results and schedule of future lab studies reviewed with patient  reviewed medications and side effects in detail    Return to clinic for further evaluation if new symptoms develop or if current symptoms worsen or fail to resolve. There are no Patient Instructions on file for this visit.

## 2018-10-04 ENCOUNTER — HOSPITAL ENCOUNTER (OUTPATIENT)
Dept: GENERAL RADIOLOGY | Age: 78
Discharge: HOME | End: 2018-10-04
Payer: MEDICARE

## 2018-10-04 DIAGNOSIS — M54.16 LUMBAR RADICULOPATHY: ICD-10-CM

## 2018-10-04 PROCEDURE — 72120 X-RAY BEND ONLY L-S SPINE: CPT

## 2018-11-06 ENCOUNTER — TELEPHONE (OUTPATIENT)
Dept: INTERNAL MEDICINE CLINIC | Age: 78
End: 2018-11-06

## 2018-11-06 NOTE — TELEPHONE ENCOUNTER
----- Message from Willy Carpenter sent at 11/6/2018 12:58 PM EST -----  Regarding: Dr. Rochelle Batista () requesting a call back in regards to being recommended to an office to receive a MRI. Best contact is [phone].

## 2018-12-04 ENCOUNTER — HOSPITAL ENCOUNTER (OUTPATIENT)
Dept: MRI IMAGING | Age: 78
Discharge: HOME | End: 2018-12-04
Attending: PHYSICIAN ASSISTANT
Payer: MEDICARE

## 2018-12-04 DIAGNOSIS — M54.16 RADICULOPATHY, LUMBAR REGION: ICD-10-CM

## 2018-12-04 PROCEDURE — 72148 MRI LUMBAR SPINE W/O DYE: CPT

## 2018-12-17 ENCOUNTER — OFFICE VISIT (OUTPATIENT)
Dept: INTERNAL MEDICINE CLINIC | Age: 78
End: 2018-12-17

## 2018-12-17 VITALS
DIASTOLIC BLOOD PRESSURE: 79 MMHG | TEMPERATURE: 98.2 F | OXYGEN SATURATION: 98 % | WEIGHT: 117 LBS | HEIGHT: 64 IN | RESPIRATION RATE: 16 BRPM | BODY MASS INDEX: 19.97 KG/M2 | SYSTOLIC BLOOD PRESSURE: 138 MMHG | HEART RATE: 77 BPM

## 2018-12-17 DIAGNOSIS — G30.8 ALZHEIMER'S DISEASE OF OTHER ONSET WITHOUT BEHAVIORAL DISTURBANCE: ICD-10-CM

## 2018-12-17 DIAGNOSIS — Z00.00 MEDICARE ANNUAL WELLNESS VISIT, SUBSEQUENT: Primary | ICD-10-CM

## 2018-12-17 DIAGNOSIS — I10 ESSENTIAL HYPERTENSION: ICD-10-CM

## 2018-12-17 DIAGNOSIS — F02.80 ALZHEIMER'S DISEASE OF OTHER ONSET WITHOUT BEHAVIORAL DISTURBANCE: ICD-10-CM

## 2018-12-17 DIAGNOSIS — G89.29 CHRONIC BILATERAL LOW BACK PAIN, WITH SCIATICA PRESENCE UNSPECIFIED: ICD-10-CM

## 2018-12-17 DIAGNOSIS — Z71.89 ADVANCED CARE PLANNING/COUNSELING DISCUSSION: ICD-10-CM

## 2018-12-17 DIAGNOSIS — Z87.898 HISTORY OF SYNCOPE: ICD-10-CM

## 2018-12-17 DIAGNOSIS — M20.41 HAMMERTOE OF RIGHT FOOT: ICD-10-CM

## 2018-12-17 DIAGNOSIS — M54.5 CHRONIC BILATERAL LOW BACK PAIN, WITH SCIATICA PRESENCE UNSPECIFIED: ICD-10-CM

## 2018-12-17 RX ORDER — ACETAMINOPHEN 500 MG
500 TABLET ORAL
Qty: 30 TAB | Refills: 0 | Status: SHIPPED | OUTPATIENT
Start: 2018-12-17 | End: 2020-01-08 | Stop reason: ALTCHOICE

## 2018-12-17 RX ORDER — DONEPEZIL HYDROCHLORIDE 10 MG/1
10 TABLET, FILM COATED ORAL DAILY
Qty: 30 TAB | Refills: 5
Start: 2018-12-17 | End: 2018-12-17 | Stop reason: ALTCHOICE

## 2018-12-17 RX ORDER — ACETAMINOPHEN 500 MG
500 TABLET ORAL
Qty: 30 TAB | Refills: 0
Start: 2018-12-17 | End: 2018-12-17

## 2018-12-17 RX ORDER — PANTOPRAZOLE SODIUM 40 MG/1
40 TABLET, DELAYED RELEASE ORAL DAILY
Qty: 60 TAB | Refills: 2
Start: 2018-12-17 | End: 2019-05-16 | Stop reason: ALTCHOICE

## 2018-12-17 RX ORDER — CHOLECALCIFEROL (VITAMIN D3) 125 MCG
CAPSULE ORAL
COMMUNITY
End: 2020-01-08 | Stop reason: ALTCHOICE

## 2018-12-17 RX ORDER — FENTANYL 12.5 UG/1
1 PATCH TRANSDERMAL
Refills: 0 | COMMUNITY
Start: 2018-12-05 | End: 2018-12-17 | Stop reason: ALTCHOICE

## 2018-12-17 NOTE — PROGRESS NOTES
1. Have you been to the ER, urgent care clinic since your last visit? Hospitalized since your last visit? Yes Where: [facility]    2. Have you seen or consulted any other health care providers outside of the [address] [place] [address] since your last visit? Include any pap smears or colon screening.  No

## 2018-12-17 NOTE — PROGRESS NOTES
HISTORY OF PRESENT ILLNESS    Chief Complaint   Patient presents with    Annual Wellness Visit       Presents for follow-up  Hx per . Limited due to dementia and  interjects  She is nonsensical.    Admitted  11/21-11/22/18 for upper GI bleeding and a fall.  hgb 10.3  No transfusion  Has appt Dr. Noa Cai next month  Taking pantoprazole bid  Still taking asa. Hx HTN  Stopped losartan 3 weeks ago per . Not taking metoprolol per recommendation for a few months    Dementia  Saw Oscar 2018. Stopped aricept and was told to keep taking namneda. Goal was to reduce falls. Last fall was 11/21/18. No cane or assited devices    Chronic back paoin  Saw pain management  Has severe DJD, DDD  Given fentanyl patch 12 mcg 12/5 and filled #5, but did not take his  feared effects, but did not want to start. Cant use nsaids due to ulcer. .    Not using medications for pain.  asks about tylenol. He is walking and moving without c/o pain and is comfortable appearing    Review of Systems   All other systems reviewed and are negative, except as noted in HPI    Past Medical and Surgical History   has a past medical history of Alzheimer's dementia, Chronic back pain, Dehydration, Lumbar stenosis, Normal cardiac stress test, PVCs (premature ventricular contractions), Retinal disease, Subdural hematoma ([address] and Upper GI bleed. has a past surgical history that includes hx lumbar laminectomy (2011); hx orthopaedic; hx heent; hx tubal ligation; hx urological; hx colonoscopy (01/2010); hx colonoscopy (09/08/2006); and ESOPHAGOGASTRODUODENOSCOPY (EGD) (N/A, 9/23/2017). reports that she has quit smoking. she has never used smokeless tobacco. She reports that she drinks alcohol. She reports that she does not use drugs. family history includes Arthritis-osteo in her sister;  Hypertension in her mother; Lung Disease in her father and mother; No Known Problems in her brother, daughter, and daughter. Physical Exam   Nursing note and vitals reviewed. Blood pressure 138/79, pulse 77, temperature 98.2 °F (36.8 °C), temperature source Oral, resp. rate 16, height 5' 4\" (1.626 m), weight 117 lb (53.1 kg), SpO2 98 %. Constitutional:  No distress. Eyes: Conjunctivae are normal.   Ears:  Hearing grossly intact  Cardiovascular: Normal rate. regular rhythm, no murmurs or gallops  No edema  Pulmonary/Chest: Effort normal.   CTAB  Musculoskeletal: moves all 4 extremities   Neurological: Alert and oriented to person, place, and time. Skin: No rash noted. Psychiatric: Normal mood and affect. Behavior is normal.     ASSESSMENT and PLAN  Diagnoses and all orders for this visit:    1. Medicare annual wellness visit, subsequent  2. Advanced care planning/counseling discussion    3. Essential hypertension  Controlled on current regimen. Continue current medications as written in chart. 4. History of syncope    5. Alzheimer's disease of other onset without behavioral disturbance  Severe, taking namenda only per neurology, nonsensical   care for her    6. Chronic bilateral low back pain, with sciatica presence unspecified  She appears stable without severe pain  Agree w not using fentanyl for now. Could consider in the future for comfort care. No surgery is recommended  -     acetaminophen (TYLENOL) 500 mg tablet; Take 1 Tab by mouth every four (4) hours as needed for Pain. 7. Hammertoe of right foot  Consult podiatry. Scab on dorsal portion. No ulcers    lab results and schedule of future lab studies reviewed with patient  reviewed medications and side effects in detail    Return to clinic for further evaluation if new symptoms develop    Follow-up Disposition: Not on File    Current Outpatient Medications   Medication Sig    cholecalciferol, vitamin D3, (VITAMIN D3) 2,000 unit tab Take  by mouth.     fentaNYL (DURAGESIC) 12 mcg/hr patch 1 Patch by TransDERmal route every fourty-eight (48) hours.  donepezil (ARICEPT) 10 mg tablet Take 1 Tab by mouth daily.  memantine ER (NAMENDA XR) 28 mg capsule TAKE 1 CAPSULE BY MOUTH EVERY DAY    LACTOBACILLUS ACIDOPHILUS (PROBIOTIC PO) Take 1 Cap by mouth daily.  ASPIRIN (ASPIR-81 PO) Take 81 mg by mouth daily. No current facility-administered medications for this visit.

## 2018-12-17 NOTE — ACP (ADVANCE CARE PLANNING)
Advance Care Planning (ACP) Provider Note - Comprehensive     Date of ACP Conversation: 12/17/18  Persons included in Conversation:  patient  Length of ACP Conversation in minutes:  <16 minutes (Non-Billable)    Authorized Decision Maker (if patient is incapable of making informed decisions): This person is:  Healthcare Agent/Medical Power of  under Advance Directive          General ACP for ALL Patients with Decision Making Capacity:   Importance of advance care planning, including choosing a healthcare agent to communicate patient's healthcare decisions if patient lost the ability to make decisions, such as after a sudden illness or accident  Understanding of the healthcare agent role was assessed and information provided  Exploration of values, goals, and preferences if recovery is not expected, even with continued medical treatment in the event of: Imminent death  Severe, permanent brain injury    Review of Existing Advance Directive:  not availble     For Serious or Chronic Illness:  Understanding of medical condition    Understanding of CPR, goals and expected outcomes, benefits and burdens discussed. Information on CPR success rates provided (e.g. for CPR in hospital, survival to d/c at two weeks is 22%, for chronically ill or elderly/frail survival is less than 3%); Individual asked to communicate understanding of information in his/her own words.   Explored fears and concerns regarding CPR or possible outcomes    Interventions Provided:  Recommended completion of Advance Directive form after review of ACP materials and conversation with prospective healthcare agent   Recommended communicating the plan and making copies for the healthcare agent, personal physician, and others as appropriate (e.g., health system)

## 2018-12-18 PROBLEM — Z71.89 ADVANCED CARE PLANNING/COUNSELING DISCUSSION: Status: ACTIVE | Noted: 2018-12-18

## 2018-12-18 NOTE — PATIENT INSTRUCTIONS
Medicare Part B Preventive Services Guidelines/Limitations Date last completed and Frequency Due Date   Bone Mass Measurement  (age 72 & older, biennial) Requires diagnosis related to osteoporosis or estrogen deficiency. Biennial benefit unless patient has history of long-term glucocorticoid tx or baseline is needed because initial test was by other method Completed 9/2006    Recommended every 2 years As recommended by your PCP or Specialist     Cardiovascular Screening Blood Tests (every 5 years)  Total cholesterol, HDL, Triglycerides Order as a panel if possible Completed 7/2016    As recommended by your PCP As recommended by your PCP or Specialist   Colorectal Cancer Screening  -Fecal occult blood test (annual)  -Flexible sigmoidoscopy (5y)  -Screening colonoscopy (10y)  -Barium Enema Age 49-80; After age [de-identified] if history of abnormal results Completed 1/2010     Recommended every 5 to 10 years  As recommended by your PCP or Specialist     Counseling to Prevent Tobacco Use (up to 8 sessions per year)  - Counseling greater than 3 and up to 10 minutes  - Counseling greater than 10 minutes Patients must be asymptomatic of tobacco-related conditions to receive as preventive service N/A N/A   Diabetes Screening Tests (at least every 3 years, Medicare covers annually or at 6-month intervals for prediabetic patients)    Fasting blood sugar (FBS) or glucose tolerance test (GTT) Patient must be diagnosed with one of the following:  -Hypertension, Dyslipidemia, obesity, previous impaired FBS or GTT  Or any two of the following: overweight, FH of diabetes, age ? 72, history of gestational diabetes, birth of baby weighing more than 9 pounds Completed 9/2018    Recommended every 3 years for non-diabetics    Recommended every 3-6 months for Pre-Diabetics and Diabetics As recommended by your PCP or Specialist     Glaucoma Screening (no USPSTF recommendation) Diabetes mellitus, family history, , age 48 or over,  American, age 72 or over Completed within the last year or two    Recommended annually As recommended by your PCP or Specialist   Seasonal Influenza Vaccination (annually)  Recommended Annually Due Fall 2018   TDAP Vaccination  Completed 10/2015    Recommended every 10 years As recommended by your PCP or Specialist   Zoster (Shingles) Vaccination Covered by Medicare Part D through the pharmacy- PCP provides prescription Completed 1/2009    Recommended once over age 48  Complete   Pneumococcal Vaccination (once after 72)  Pneumo 23- 11/2007  Recommended once over the age of 72    Prevnar 15- 6/2015 Recommended once over the age of 72 Complete        Complete   Screening Mammography (biennial age 54-69) Annually (age 36 or over) As recommended by your PCP or Specialist   As recommended by your PCP or Specialist     Screening Pap Tests and Pelvic Examination (up to age 79 and after 79 if unknown history or abnormal study last 10 years) Every 24 months except high risk As recommended by your PCP or Specialist   As recommended by your PCP or Specialist     Family Practice Management 2011    Please bring a copy of your completed advance medical directive to the office so it may be added to your medical record. Thank you. If you have any questions or concerns please feel free to contact me at [phone]. It was a pleasure meeting you today and participating in your healthcare. Santana Yates RN      Medicare Wellness Visit, Female     The best way to live healthy is to have a lifestyle where you eat a well-balanced diet, exercise regularly, limit alcohol use, and quit all forms of tobacco/nicotine, if applicable. Regular preventive services are another way to keep healthy. Preventive services (vaccines, screening tests, monitoring & exams) can help personalize your care plan, which helps you manage your own care.  Screening tests can find health problems at the earliest stages, when they are easiest to treat.   [employer] follows the current, evidence-based guidelines published by the [facility] ([facility]) when recommending preventive services for our patients. Because we follow these guidelines, sometimes recommendations change over time as research supports it. (For example, mammograms used to be recommended annually. Even though Medicare will still pay for an annual mammogram, the newer guidelines recommend a mammogram every two years for women of average risk.)  Of course, you and your doctor may decide to screen more often for some diseases, based on your risk and your health status. Preventive services for you include:  - Medicare offers their members a free annual wellness visit, which is time for you and your primary care provider to discuss and plan for your preventive service needs. Take advantage of this benefit every year!  -All adults over the age of 72 should receive the recommended pneumonia vaccines. Current USPSTF guidelines recommend a series of two vaccines for the best pneumonia protection.   -All adults should have a flu vaccine yearly and a tetanus vaccine every 10 years. All adults age 61 and older should receive a shingles vaccine once in their lifetime.    -A bone mass density test is recommended when a woman turns 65 to screen for osteoporosis. This test is only recommended one time, as a screening. Some providers will use this same test as a disease monitoring tool if you already have osteoporosis.   -All adults age 38-68 who are overweight should have a diabetes screening test once every three years.   -Other screening tests and preventive services for persons with diabetes include: an eye exam to screen for diabetic retinopathy, a kidney function test, a foot exam, and stricter control over your cholesterol.   -Cardiovascular screening for adults with routine risk involves an electrocardiogram (ECG) at intervals determined by your doctor.   -Colorectal cancer screenings should be done for adults age 54-65 with no increased risk factors for colorectal cancer. There are a number of acceptable methods of screening for this type of cancer. Each test has its own benefits and drawbacks. Discuss with your doctor what is most appropriate for you during your annual wellness visit. The different tests include: colonoscopy (considered the best screening method), a fecal occult blood test, a fecal DNA test, and sigmoidoscopy. -Breast cancer screenings are recommended every other year for women of normal risk, age 54-69.  -Cervical cancer screenings for women over age 72 are only recommended with certain risk factors.   -All adults born between [facility][1] should be screened once for Hepatitis C.      Here is a list of your current Health Maintenance items (your personalized list of preventive services) with a due date:  Health Maintenance Due   Topic Date Due    Shingles Vaccine (1 of 2) 08/03/1992    Glaucoma Screening   08/03/2007    Flu Vaccine  08/01/2018

## 2018-12-18 NOTE — PROGRESS NOTES
Nurse Navigator Medicare Wellness Visit performed by LINDA Tello RN    This is the Subsequent Medicare Annual Wellness Exam, performed 12 months or more after the Initial AWV or the last Subsequent AWV    I have reviewed the patient's medical history in detail and updated the computerized patient record. History     Past Medical History:   Diagnosis Date    Alzheimer's dementia     moderate. clinical trial 6/2017-6/2017 [facility].  stopped aricept fall 2018    Chronic back pain     lumbar. Dr. 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  surgery 2011    Normal cardiac stress test 07/2017    PVCs (premature ventricular contractions)     Retinal disease     Dr. Umer Kwong Subdural hematoma [facility]) 2012    Upper GI bleed 11/12/2018    presumed ulcer. no endscopy due to risk      Past Surgical History:   Procedure Laterality Date    HX COLONOSCOPY  01/2010    normal    HX COLONOSCOPY  09/08/2006    polyp    HX HEENT      vitrectomy    HX LUMBAR LAMINECTOMY  2011    HX ORTHOPAEDIC      hammertoe repair    HX TUBAL LIGATION      HX UROLOGICAL      bladder suspension     Current Outpatient Medications   Medication Sig Dispense Refill    cholecalciferol, vitamin D3, (VITAMIN D3) 2,000 unit tab Take  by mouth.  pantoprazole (PROTONIX) 40 mg tablet Take 1 Tab by mouth daily. 60 Tab 2    acetaminophen (TYLENOL) 500 mg tablet Take 1 Tab by mouth every four (4) hours as needed for Pain. 30 Tab 0    memantine ER (NAMENDA XR) 28 mg capsule TAKE 1 CAPSULE BY MOUTH EVERY DAY 90 Cap 1    LACTOBACILLUS ACIDOPHILUS (PROBIOTIC PO) Take 1 Cap by mouth daily.        Allergies   Allergen Reactions    Oxycodone Other (comments)     psychosis       Family History   Problem Relation Age of Onset    Lung Disease Mother     Hypertension Mother     Lung Disease Father     Arthritis-osteo Sister     No Known Problems Brother     No Known Problems Daughter     No Known Problems Daughter      Social History     Tobacco Use    Smoking status: Former Smoker    Smokeless tobacco: Never Used   Substance Use Topics    Alcohol use: Yes     Comment: occaisional     Patient Active Problem List   Diagnosis Code    Alzheimer's dementia G30.9, F02.80    PVCs (premature ventricular contractions) I49.3    Essential hypertension I10    Ceruminosis, right H61.21    Chronic bilateral low back pain M54.5, G89.29    Advanced care planning/counseling discussion Z71.89       Depression Risk Factor Screening:   Unable to assess due to patient's diagnosis of Alzheimer's; however, patient is smiling. Patient does not speak much, but does nod her head in agreement at random times. Please see PCP's progress notes for additional information,   PHQ over the last two weeks 12/18/2018   PHQ Not Done Medical Reason (indicate in comments)   Little interest or pleasure in doing things -   Feeling down, depressed, irritable, or hopeless -   Total Score PHQ 2 -     Alcohol Risk Factor Screening: You do not drink alcohol or very rarely. Functional Ability and Level of Safety:   Hearing Loss  Hearing is good. Activities of Daily Living  The home contains: handrails and grab bars  Patient needs help with:  phone, transportation, shopping, preparing meals, laundry, housework, managing medications, managing money, dressing, bathing, hygiene and bathroom needs   Patient's  states that they live in a private residence together & they moved from [place] to be closer to their children. Patient's  adds that patient has Alzheimer's & he is her primary caregiver. Patient is a very poor historian & while she attempts to participate a little in the conversation, what she says is nonsensical. Patient's  is very engaged in patient's medical history & states that he is present today to discuss with PCP, patient's chronic back pain issues.  Patient was not using an ambulation assistance device. Patient would look to her  any time NN made eye contact with her & compassionately asked a question. It was clear that the patient was very trusting of her . Patient's  states that he is the primary caregiver of patient & he helps her as often as her can. Patient's  speaks of patient in a very kind & caring manner. Some medical history can be obtained from external medical records that have been sent from patient's former PCP. ADL Assessment 12/18/2018   Feeding yourself No Help Needed   Getting from bed to chair No Help Needed   Getting dressed Help Needed   Bathing or showering Help Needed   Walk across the room (includes cane/walker) No Help Needed   Using the telphone Help Needed   Taking your medications Help Needed   Preparing meals Help Needed   Managing money (expenses/bills) Help Needed   Moderately strenuous housework (laundry) Help Needed   Shopping for personal items (toiletries/medicines) Help Needed   Shopping for groceries Help Needed   Driving Help Needed   Climbing a flight of stairs Help Needed   Getting to places beyond walking distances Help Needed     Patient's  states that falls are a definite problem for the patient & he is concerned that this may all be medication related. Patient's  adds that there have been 4 major falls within the last year & he will discuss the details with PCP today. Patient's  adds that patient has chronic back pain & back problems & she is followed by a specialist for this, but patient's  wants to review the recommended course of treatment with PCP as patient's  is concerned that the patient may fall again. Patient's  reports patient falls: Spring, June, August & November (which lead to a hospital admission).  Patient's  reports that most of the falls begin with the patient fainting which he attributes to the patient's bp medicine, so he plans to discuss this with PCP today. Patient appears in good spirits & is smiling & nodding as patient's  is talking about her. PCP notified. Fall Risk  Fall Risk Assessment, last 12 mths 12/18/2018   Able to walk? Yes   Fall in past 12 months? Yes   Fall with injury? Yes   Number of falls in past 12 months 4   Fall Risk Score 5       Abuse Screen  Patient is not abused   Abuse Screening Questionnaire 12/18/2018   Do you ever feel afraid of your partner? N   Are you in a relationship with someone who physically or mentally threatens you? N   Is it safe for you to go home? Y       Cognitive Screening   Evaluation of Cognitive Function:  Please see PCP's progress note - patient is followed by neurology for a diagnosis of Alzheimer's. Patient Care Team   Patient Care Team:  Charla Guerra MD as PCP - General (Internal Medicine)    Assessment/Plan   Education and counseling provided:  Are appropriate based on today's review and evaluation  End-of-Life planning (with patient's consent)  Pneumococcal Vaccine  Influenza Vaccine  Screening Mammography  Colorectal cancer screening tests  Bone mass measurement (DEXA)  Screening for glaucoma  tdap & shingles vaccinations    Diagnoses and all orders for this visit:    1. Medicare annual wellness visit, subsequent    2. Essential hypertension    3. History of syncope    4. Alzheimer's disease of other onset without behavioral disturbance    5. Chronic bilateral low back pain, with sciatica presence unspecified    6. Hammertoe of right foot    7. Advanced care planning/counseling discussion    Other orders  -     pantoprazole (PROTONIX) 40 mg tablet; Take 1 Tab by mouth daily. -     acetaminophen (TYLENOL) 500 mg tablet; Take 1 Tab by mouth every four (4) hours as needed for Pain. AWV Summary:    1. Patient's  states that a completed Advanced Medical Directive is at home.  NN encouraged patient's  to bring a copy of the completed Advanced Medical Directive to the office for scanning into the medical record. Patient's  verbalized understanding & agreement. 2. Patient is up to date on the following immunizations:  Immunization History   Administered Date(s) Administered    Pneumococcal Conjugate (PCV-13) 06/18/2015    Pneumococcal Polysaccharide (PPSV-23) 11/08/2007    TB Skin Test (PPD) Intradermal 11/13/2017    Tdap 08/30/2006, 11/10/2015    Zoster Vaccine, Live 01/01/2009   Patient's  confirmed the aforementioned preventative immunization dates are correct. Patient's  states that they will consider getting a flu shot today at the pharmacy downstairs; they will also look into getting the new Shingrix shingles vaccine series after the 1st of the new year. Patient's health maintenance immunization record has been updated & is current. 3. Patient is a poor historian due to a diagnosis of Alzheimer's, but patient's previous PCP records show the following: Mammography (not indicated due to patient's age), pap/ pelvic, DEXA scan (completed 9/2006) & colonoscopy (completed 1/2010 performed by Dr. Pelon Bailey with 5 to 10 year screening follow-up). Patient's  is very knowledgeable in regards to patient's medical history. 4. Patient was not wearing corrective lenses. Patient's  reports patient having a routine eye exam & glaucoma screening within the last two years performed by Dr. Larance Essex at [facility] in [place], [place]. HECTOR faxed requesting a copy of patient's last eye exam with glaucoma screening with patient's verbal approval.     Patient verbalized understanding of all information discussed. Patient was given the opportunity to ask questions. Medication reconciliation completed by MA/ LPN and reviewed by PCP. Patient provided AVS, either a printed version or electronic version in [employer] which includes Medicare Wellness Preventative Screening Table.     Health Maintenance Due   Topic Date Due    Shingrix Vaccine Age 50> (1 of 2) 08/03/1992    GLAUCOMA SCREENING Q2Y  08/03/2007    Influenza Age 9 to Adult  08/01/2018

## 2018-12-18 NOTE — ACP (ADVANCE CARE PLANNING)
Patient's  states that a completed Advanced Medical Directive is at home. NN encouraged patient's  to bring a copy of the completed Advanced Medical Directive to the office for scanning into the medical record. Patient's  verbalized understanding & agreement.

## 2019-05-16 ENCOUNTER — TELEPHONE (OUTPATIENT)
Dept: INTERNAL MEDICINE CLINIC | Age: 79
End: 2019-05-16

## 2019-05-16 RX ORDER — MEMANTINE HYDROCHLORIDE 28 MG/1
28 CAPSULE, EXTENDED RELEASE ORAL DAILY
Qty: 90 CAP | Refills: 3 | Status: SHIPPED | OUTPATIENT
Start: 2019-05-16 | End: 2020-01-08 | Stop reason: ALTCHOICE

## 2019-11-29 ENCOUNTER — TELEPHONE (OUTPATIENT)
Dept: INTERNAL MEDICINE CLINIC | Age: 79
End: 2019-11-29

## 2019-11-29 NOTE — TELEPHONE ENCOUNTER
Spoke to pt's  Deandre Mack), he asked if his wife's pneumonia vaccine is up to date and also asked if she can get a flu vaccine in the office once patient is discharge from [facility]. Explained that she can get a flu vaccine in the office but she will need a follow up appt. Also advised to request flu vaccine in hospital before discharge,  verbalized understanding.

## 2019-12-27 ENCOUNTER — PATIENT OUTREACH (OUTPATIENT)
Dept: FAMILY MEDICINE CLINIC | Age: 79
End: 2019-12-27

## 2019-12-27 RX ORDER — TRAZODONE HYDROCHLORIDE 50 MG/1
50 TABLET ORAL AS NEEDED
COMMUNITY
End: 2021-09-27 | Stop reason: SDUPTHER

## 2020-01-07 ENCOUNTER — PATIENT OUTREACH (OUTPATIENT)
Dept: FAMILY MEDICINE CLINIC | Age: 80
End: 2020-01-07

## 2020-01-07 NOTE — PROGRESS NOTES
Called spouse   Reports no needs at this time  Patient receiving HH PT/OT  Confirmed follow up appt with PCP for tomorrow  Spouse has CTN contact information for any future needs

## 2020-01-08 ENCOUNTER — OFFICE VISIT (OUTPATIENT)
Dept: INTERNAL MEDICINE CLINIC | Age: 80
End: 2020-01-08

## 2020-01-08 VITALS
HEIGHT: 64 IN | SYSTOLIC BLOOD PRESSURE: 110 MMHG | OXYGEN SATURATION: 94 % | RESPIRATION RATE: 18 BRPM | WEIGHT: 116 LBS | BODY MASS INDEX: 19.81 KG/M2 | DIASTOLIC BLOOD PRESSURE: 59 MMHG | HEART RATE: 60 BPM

## 2020-01-08 DIAGNOSIS — Z86.79 HISTORY OF SUBARACHNOID HEMORRHAGE: ICD-10-CM

## 2020-01-08 DIAGNOSIS — S06.5XAA SUBDURAL HEMATOMA: ICD-10-CM

## 2020-01-08 DIAGNOSIS — F02.80 ALZHEIMER'S DISEASE OF OTHER ONSET WITHOUT BEHAVIORAL DISTURBANCE: ICD-10-CM

## 2020-01-08 DIAGNOSIS — I95.1 ORTHOSTATIC HYPOTENSION: Primary | ICD-10-CM

## 2020-01-08 DIAGNOSIS — Z23 ENCOUNTER FOR IMMUNIZATION: ICD-10-CM

## 2020-01-08 DIAGNOSIS — K59.09 CHRONIC CONSTIPATION: ICD-10-CM

## 2020-01-08 DIAGNOSIS — G30.8 ALZHEIMER'S DISEASE OF OTHER ONSET WITHOUT BEHAVIORAL DISTURBANCE: ICD-10-CM

## 2020-01-08 RX ORDER — CHOLECALCIFEROL (VITAMIN D3) 125 MCG
1 CAPSULE ORAL DAILY
Qty: 30 TAB | Refills: 5
Start: 2020-01-08 | End: 2021-09-27

## 2020-01-08 RX ORDER — MIDODRINE HYDROCHLORIDE 2.5 MG/1
2.5 TABLET ORAL 2 TIMES DAILY
Qty: 60 TAB | Refills: 2 | Status: SHIPPED | OUTPATIENT
Start: 2020-01-08 | End: 2020-06-03 | Stop reason: SDUPTHER

## 2020-01-08 RX ORDER — POLYETHYLENE GLYCOL 3350 17 G/17G
17 POWDER, FOR SOLUTION ORAL DAILY
Qty: 225 G | Refills: 3
Start: 2020-01-08

## 2020-01-08 RX ORDER — MIDODRINE HYDROCHLORIDE 2.5 MG/1
5 TABLET ORAL
COMMUNITY
Start: 2019-12-09 | End: 2020-01-08

## 2020-01-08 NOTE — PROGRESS NOTES
HISTORY OF PRESENT ILLNESS    Chief Complaint   Patient presents with   [facility] Follow Up     Jaret Cornell 11/24-12/9 traumatic SAH and SDH, rehab at [facility] 12/9-12/23; Pt says Anurag Hamilton was supposed to have records from [place]    Other     pt was not taking any medications before fall on November 24th then was given meds in hospital but pt is no longer taking meds again       Presents for follow-up  She is accompanied by her . Patient is a very poor historian due to severe dementia. Patient was admitted to the [facility] November 24 to December 9 for traumatic subarachnoid hemorrhage and right parietal subdural hematoma. and right clavicular fracture. She does have a past history of subdural hematoma. On this occasion, she lost her footing on the edge of a rug fell down striking the right side of her head. She did not lose consciousness. Not have any seizure activity. ER, CT showed left convexity subdural and subarachnoid blood with scalp hematoma. Neurosurgery admitted the patient. She was observed in the hospital.  In the hospital, she was significantly lethargic with acute metabolic encephalopathy secondary likely to hypotension with severe orthostatic hypotension. She was placed on IV fluids and Midodrin and her mentation improved to baseline. Losartan was discontinued and she remains off that now. Repeat CT showed no other acute findings. Right clavicular fracture was conservatively treated. She is doing well w no pain regarding that. Chest x-ray 12/5/2019 showed slight increased interstitial markings present at lung bases, normal cardiac silhouette, no thorax or effusion, no suspicious lesions. She was discharged on vitamin D, pantoprazole, midodrine 5 mg twice daily and MiraLAX daily for constipation. She then went to [facility] rehab December 9 through December 23.   She worked with physical therapy and Occupational Therapy and made some progress and ability to tolerate walking short distances with assistance. No other medication changes were made. Was discharged home with [facility]. They are working with physical therapy, Occupational Therapy, speech therapy. Patient is drinking and eating some, but her appetite has not been great. She is somewhat somnolent at times. She is still requires full assistance to walk. She gets significantly dizzy. That said, symptoms were better until she ran out of midodrine 3 days ago. Review of Systems   All other systems reviewed and are negative, except as noted in HPI    Past Medical and Surgical History   has a past medical history of Alzheimer's dementia ([facility] [address] Chronic back pain, Dehydration, History of essential hypertension, History of subarachnoid hemorrhage (11/24/2019), Lumbar stenosis (2011), Normal cardiac stress test (07/2017), PVCs (premature ventricular contractions), Retinal disease, Subdural hematoma ([facility] [address] (2012, 11/24/19), and Upper GI bleed (11/12/2018). has a past surgical history that includes hx lumbar laminectomy (2011); hx orthopaedic; hx heent; hx tubal ligation; hx urological; hx colonoscopy (01/2010); and hx colonoscopy (09/08/2006). reports that she has quit smoking. She has never used smokeless tobacco. She reports current alcohol use. She reports that she does not use drugs. family history includes Arthritis-osteo in her sister; Hypertension in her mother; Lung Disease in her father and mother; No Known Problems in her brother, daughter, and daughter. Physical Exam   Nursing note and vitals reviewed. Blood pressure 110/59, pulse 60, resp. rate 18, height 5' 4\" (1.626 m), weight 116 lb (52.6 kg), SpO2 94 %. Constitutional: Somnolent, but smiles and mutters nonsensically decent eye contact when awake. .    Eyes: Conjunctivae are normal.   Ears:  Hearing grossly intact  Cardiovascular: Normal rate.   regular rhythm, no murmurs or gallops  No edema  Pulmonary/Chest: Effort normal.   CTAB  Musculoskeletal: moves all 4 extremities   Neurological: Somnolent, not oriented at all. Skin: No rash noted. ASSESSMENT and PLAN  Diagnoses and all orders for this visit:    1. Orthostatic hypotension   Controlled symptoms off of medication. Resume. Try to push fluids as much as possible. Can increase salt in diet as well. May increase the medication as needed. Use great caution with standing. Physical therapy and Occupational Therapy are working with her. [employer] will be for her to be able to ambulate with a rolling walker think would be the best possible outcome. Wheelchair is also been to be necessary at this point.  -     midodrine (PROAMITINE) 2.5 mg tablet; Take 1 Tab by mouth two (2) times a day. Indications: a feeling of dizziness upon standing due to a drop in blood pressure    2. Chronic constipation  Per GI, has worked out regimen with MiraLAX which seems to be working.  -     polyethylene glycol (MIRALAX) 17 gram/dose powder; Take 17 g by mouth daily. 3. Alzheimer's disease of other onset without behavioral disturbance [facility])  Patient has severe progressive Alzheimer's with aphasia now progressively declining function. Has been off of medications because of unclear benefit and side effects. I think this is very reasonable.  is prepared for continued progressive decline and is prepared to admit her for nursing home memory care unit if needed. He also likely will need some respite care. I can complete forms if this is within the next 30 days. Chest x-ray from December 5 showed no evidence of tuberculosis.  is aware that artificial feeding and hydration is not indicated. 4. Encounter for immunization  -     INFLUENZA VACCINE INACTIVATED (IIV), SUBUNIT, ADJUVANTED, IM  -     ADMIN INFLUENZA VIRUS VAC    5. Subdural hematoma (HCC)  6. History of subarachnoid hemorrhage  Appears to be relatively at baseline although somewhat somnolent today. Somnolence is more likely secondary to hypotension. Fall risk precautions. Further imaging is needed. lab results and schedule of future lab studies reviewed with patient  reviewed medications and side effects in detail    Return to clinic for further evaluation if new symptoms develop        Current Outpatient Medications   Medication Sig    midodrine (PROAMITINE) 2.5 mg tablet Take 1 Tab by mouth two (2) times a day. Indications: a feeling of dizziness upon standing due to a drop in blood pressure    polyethylene glycol (MIRALAX) 17 gram/dose powder Take 17 g by mouth daily.  cholecalciferol, vitamin D3, (VITAMIN D3) 2,000 unit tab Take 1 Tab by mouth daily. Take  by mouth.  traZODone (DESYREL) 50 mg tablet Take 50 mg by mouth as needed for Sleep. No current facility-administered medications for this visit.

## 2020-03-05 ENCOUNTER — HOSPITAL ENCOUNTER (OUTPATIENT)
Dept: LAB | Age: 80
Discharge: HOME | End: 2020-03-05

## 2020-03-05 ENCOUNTER — OFFICE VISIT (OUTPATIENT)
Dept: INTERNAL MEDICINE CLINIC | Age: 80
End: 2020-03-05

## 2020-03-05 VITALS
RESPIRATION RATE: 18 BRPM | SYSTOLIC BLOOD PRESSURE: 138 MMHG | OXYGEN SATURATION: 93 % | BODY MASS INDEX: 21.03 KG/M2 | HEIGHT: 64 IN | HEART RATE: 55 BPM | DIASTOLIC BLOOD PRESSURE: 73 MMHG | WEIGHT: 123.19 LBS

## 2020-03-05 DIAGNOSIS — Z00.00 MEDICARE ANNUAL WELLNESS VISIT, SUBSEQUENT: Primary | ICD-10-CM

## 2020-03-05 DIAGNOSIS — G30.8 ALZHEIMER'S DISEASE OF OTHER ONSET WITHOUT BEHAVIORAL DISTURBANCE: ICD-10-CM

## 2020-03-05 DIAGNOSIS — F02.80 ALZHEIMER'S DISEASE OF OTHER ONSET WITHOUT BEHAVIORAL DISTURBANCE: ICD-10-CM

## 2020-03-05 DIAGNOSIS — I95.1 ORTHOSTATIC HYPOTENSION: ICD-10-CM

## 2020-03-05 DIAGNOSIS — Z00.00 MEDICARE ANNUAL WELLNESS VISIT, SUBSEQUENT: ICD-10-CM

## 2020-03-05 LAB
25(OH)D3 SERPL-MCNC: 30.5 NG/ML (ref 30–100)
ALBUMIN SERPL-MCNC: 3.6 G/DL (ref 3.5–5)
ALBUMIN/GLOB SERPL: 1.2 {RATIO} (ref 1.1–2.2)
ALP SERPL-CCNC: 104 U/L (ref 45–117)
ALT SERPL-CCNC: 18 U/L (ref 12–78)
ANION GAP SERPL CALC-SCNC: 8 MMOL/L (ref 5–15)
AST SERPL-CCNC: 18 U/L (ref 15–37)
BILIRUB SERPL-MCNC: 0.3 MG/DL (ref 0.2–1)
BUN SERPL-MCNC: 23 MG/DL (ref 6–20)
BUN/CREAT SERPL: 38 (ref 12–20)
CALCIUM SERPL-MCNC: 8.7 MG/DL (ref 8.5–10.1)
CHLORIDE SERPL-SCNC: 103 MMOL/L (ref 97–108)
CO2 SERPL-SCNC: 27 MMOL/L (ref 21–32)
CREAT SERPL-MCNC: 0.61 MG/DL (ref 0.55–1.02)
ERYTHROCYTE [DISTWIDTH] IN BLOOD BY AUTOMATED COUNT: 14.1 % (ref 11.5–14.5)
GLOBULIN SER CALC-MCNC: 3.1 G/DL (ref 2–4)
GLUCOSE SERPL-MCNC: 86 MG/DL (ref 65–100)
HCT VFR BLD AUTO: 41.1 % (ref 35–47)
HGB BLD-MCNC: 12.9 G/DL (ref 11.5–16)
MCH RBC QN AUTO: 28.5 PG (ref 26–34)
MCHC RBC AUTO-ENTMCNC: 31.4 G/DL (ref 30–36.5)
MCV RBC AUTO: 90.9 FL (ref 80–99)
NRBC # BLD: 0 K/UL (ref 0–0.01)
NRBC BLD-RTO: 0 PER 100 WBC
PLATELET # BLD AUTO: 228 K/UL (ref 150–400)
PMV BLD AUTO: 11.8 FL (ref 8.9–12.9)
POTASSIUM SERPL-SCNC: 4 MMOL/L (ref 3.5–5.1)
PROT SERPL-MCNC: 6.7 G/DL (ref 6.4–8.2)
RBC # BLD AUTO: 4.52 M/UL (ref 3.8–5.2)
SODIUM SERPL-SCNC: 138 MMOL/L (ref 136–145)
WBC # BLD AUTO: 7.4 K/UL (ref 3.6–11)

## 2020-03-07 NOTE — PROGRESS NOTES
This is a Subsequent Medicare Annual Wellness Visit providing Personalized Prevention Plan Services (PPPS) (Performed 12 months after initial AWV and PPPS )    I have reviewed the patient's medical history in detail and updated the computerized patient record. She is with caregiver and her . Patient voices no complaints due to severe dementia. She was trying to escape from room when  and I walked in,. No recent falls or injuries. Taking midodrone. Sleeping well without trazodone. History     Past Medical History:   Diagnosis Date    Alzheimer's dementia ([address]     moderate. clinical trial 6/2017-6/2017 [facility].  stopped aricept fall 2018    Chronic back pain     lumbar. Dr. Albert March in [place] 12/2018    Dehydration     History of essential hypertension     History of subarachnoid hemorrhage 11/24/2019    Lumbar stenosis 2011    moderate to severe w andrew DJD MRI 12/4/18.  surgery 2011    Normal cardiac stress test 07/2017    PVCs (premature ventricular contractions)     Retinal disease     Dr. Juliana Olguin Subdural hematoma ([facility] [address] 2012, 11/24/19    Upper GI bleed 11/12/2018    presumed ulcer. no endscopy due to risk       Past Surgical History:   Procedure Laterality Date    HX COLONOSCOPY  01/2010    normal    HX COLONOSCOPY  09/08/2006    polyp    HX HEENT      vitrectomy    HX LUMBAR LAMINECTOMY  2011    HX ORTHOPAEDIC      hammertoe repair    HX TUBAL LIGATION      HX UROLOGICAL      bladder suspension       Current Outpatient Medications   Medication Sig    midodrine (PROAMITINE) 2.5 mg tablet Take 1 Tab by mouth two (2) times a day. Indications: a feeling of dizziness upon standing due to a drop in blood pressure    polyethylene glycol (MIRALAX) 17 gram/dose powder Take 17 g by mouth daily.  cholecalciferol, vitamin D3, (VITAMIN D3) 2,000 unit tab Take 1 Tab by mouth daily. Take  by mouth.     traZODone (DESYREL) 50 mg tablet Take 50 mg by mouth as needed for Sleep. No current facility-administered medications for this visit. Allergies   Allergen Reactions    Oxycodone Other (comments)     psychosis         Family History   Problem Relation Age of Onset    Lung Disease Mother     Hypertension Mother     Lung Disease Father    Melisa Stamp Arthritis-osteo Sister     No Known Problems Brother     No Known Problems Daughter     No Known Problems Daughter         reports that she has quit smoking. She has never used smokeless tobacco.   reports current alcohol use. Depression Risk Factor Screening:       Alcohol Risk Factor Screening: On any occasion during the past 3 months, have you had more than 3 drinks containing alcohol? No    Do you average more than 14 drinks per week? No      Functional Ability and Level of Safety:     Hearing Loss   mild    Activities of Daily Living   Self-care. Requires assistance with: no ADLs    Fall Risk     Fall Risk Assessment, last 12 mths 1/8/2020   Able to walk? Yes   Fall in past 12 months? Yes   Fall with injury? Yes   Number of falls in past 12 months 1   Fall Risk Score 2         Abuse Screen   Patient is not abused    Review of Systems   A comprehensive review of systems was negative except for that written in the HPI. Physical Examination     Evaluation of Cognitive Function:  Severe impairtment  Mood/affect:  neutral, happy  Appearance: age appropriate  Family member/caregiver input:       Blood pressure 138/73, pulse (!) 55, resp. rate 18, height 5' 4\" (1.626 m), weight 123 lb 3 oz (55.9 kg), SpO2 93 %.   General appearance: alert, with caregiver, anxious, trying to escape room upon entering, but re-directed  Neck: supple, symmetrical, trachea midline, no adenopathy, thyroid: not enlarged, symmetric, no tenderness/mass/nodules, no carotid bruit and no JVD  Lungs: clear to auscultation bilaterally  Heart: regular rate and rhythm, S1, S2 normal, no murmur, click, rub or gallop  Extremities: extremities normal, atraumatic, no cyanosis or edema  Neuro - mumbles with word salad, decent eye constact    Patient Care Team:  Mayelin Gudino MD as PCP - General (Internal Medicine)  Mayelin Gudino MD as PCP - REHABILITATION [facility] Empaneled Provider      Advice/Referrals/Counseling   Education and counseling provided. See below for specific orders    Assessment/Plan   Diagnoses and all orders for this visit:    1. Medicare annual wellness visit, subsequent  -     CBC W/O DIFF; Future  -     METABOLIC PANEL, COMPREHENSIVE; Future  -     VITAMIN D, 25 HYDROXY; Future    2. Orthostatic hypotension  No recent falls and NP appears stable. She will always be a high fall risk due to this, poor judgement    3. Alzheimer's disease of other onset without behavioral disturbance ([address]  Severe, requires full care and guidance   elected for no further med tx after consulting neurology. Unclear to what degree she is processing information,but can be redirected, can smile when smiled at. Her QOL is questionalble  . Potential medication side effects were discussed with the patient; let me know if any occur.   Return for yearly Annual Wellness Visits

## 2020-04-08 ENCOUNTER — TELEPHONE (OUTPATIENT)
Dept: INTERNAL MEDICINE CLINIC | Age: 80
End: 2020-04-08

## 2020-04-08 DIAGNOSIS — G89.29 CHRONIC BILATERAL LOW BACK PAIN, UNSPECIFIED WHETHER SCIATICA PRESENT: ICD-10-CM

## 2020-04-08 DIAGNOSIS — M54.50 CHRONIC BILATERAL LOW BACK PAIN, UNSPECIFIED WHETHER SCIATICA PRESENT: ICD-10-CM

## 2020-04-08 DIAGNOSIS — I95.1 ORTHOSTATIC HYPOTENSION: ICD-10-CM

## 2020-04-08 DIAGNOSIS — G30.8 ALZHEIMER'S DISEASE OF OTHER ONSET WITHOUT BEHAVIORAL DISTURBANCE: Primary | ICD-10-CM

## 2020-04-08 DIAGNOSIS — R26.89 IMBALANCE: ICD-10-CM

## 2020-04-08 DIAGNOSIS — F02.80 ALZHEIMER'S DISEASE OF OTHER ONSET WITHOUT BEHAVIORAL DISTURBANCE: Primary | ICD-10-CM

## 2020-04-08 NOTE — TELEPHONE ENCOUNTER
Patient's  is requesting to speak with the nurse regarding a request for a new order to [facility] for physical therapy .      He can be reached at [phone]

## 2020-04-09 NOTE — TELEPHONE ENCOUNTER
Requesting new order for in home PT to [facility] , ,she needs gait training, she is prone to falls per  ,to [facility].

## 2020-04-13 ENCOUNTER — TELEPHONE (OUTPATIENT)
Dept: INTERNAL MEDICINE CLINIC | Age: 80
End: 2020-04-13

## 2020-04-13 NOTE — TELEPHONE ENCOUNTER
----- Message from Oscar Blackwell sent at 4/13/2020  8:45 AM EDT -----  Regarding: Dr. Sisi Underwood Message/Vendor Calls    Caller's first and last name: Shanthi a nurse with [facility] [address]      Reason for call: copy of last office notes      Callback required yes/no and why: if necessary       Best contact number(s): [phone] [phone]      Details to clarify the request: Ugo Johny stated that she will be doing start of care with the pt tomorrow April 14,2020 and she needs a copy of the pt's last signed office notes faxed to her at [phone].       Oscar Blackwell

## 2020-06-03 DIAGNOSIS — I95.1 ORTHOSTATIC HYPOTENSION: ICD-10-CM

## 2020-06-03 NOTE — TELEPHONE ENCOUNTER
----- Message from Antoine Sharp sent at 6/3/2020 11:39 AM EDT -----  Regarding: /Telephone  General Message/Vendor Calls    Caller's first and last name:  Stephanie Daley-     Reason for call:  \"Midodrine\"    Callback required yes/no and why:  Yes, to let them know if the prescription can be renewed and sent to her usual pharmacy- [facility] [phone]    Best contact number(s):  [phone]    Details to clarify the request:      Antoine Sharp

## 2020-06-04 RX ORDER — MIDODRINE HYDROCHLORIDE 2.5 MG/1
2.5 TABLET ORAL 2 TIMES DAILY
Qty: 60 TAB | Refills: 2 | Status: SHIPPED | OUTPATIENT
Start: 2020-06-04 | End: 2020-07-30

## 2020-07-30 DIAGNOSIS — I95.1 ORTHOSTATIC HYPOTENSION: ICD-10-CM

## 2020-07-30 RX ORDER — MIDODRINE HYDROCHLORIDE 2.5 MG/1
TABLET ORAL
Qty: 60 TAB | Refills: 2 | Status: SHIPPED | OUTPATIENT
Start: 2020-07-30 | End: 2020-10-26

## 2020-10-03 ENCOUNTER — HOSPITAL ENCOUNTER (EMERGENCY)
Age: 80
Discharge: HOME | End: 2020-10-03
Attending: EMERGENCY MEDICINE
Payer: MEDICARE

## 2020-10-03 DIAGNOSIS — L24.0 IRRITANT CONTACT DERMATITIS DUE TO DETERGENT: Primary | ICD-10-CM

## 2020-10-03 PROCEDURE — 99283 EMERGENCY DEPT VISIT LOW MDM: CPT

## 2020-10-04 VITALS
WEIGHT: 122.58 LBS | TEMPERATURE: 98.9 F | HEART RATE: 72 BPM | SYSTOLIC BLOOD PRESSURE: 153 MMHG | HEIGHT: 67 IN | RESPIRATION RATE: 17 BRPM | BODY MASS INDEX: 19.24 KG/M2 | OXYGEN SATURATION: 98 % | DIASTOLIC BLOOD PRESSURE: 77 MMHG

## 2020-10-04 NOTE — ED NOTES
Patient discharged by provider. Discharge paperwork reviewed and patient denies questions.   Leaves in wheelchair escorted by RN and in no apparent distress

## 2020-10-04 NOTE — ED NOTES
Spoke with [place] with Poison control to update on assessment of patient     Recommendation is to PO challenge with clear fluids and monitor for an hour, ok to discharge if tolerates, if there is  any difficulty swallowing or unable to tolerate, consult GI     Dr Alexander Martinez made aware

## 2020-10-04 NOTE — ED NOTES
Patient to exam from 15 from waiting room     Per , patient had bitten a cascade dish pod at 80, upon examining pod, no liquid was released from the pod only powder substance and unknown amount    This RN has not physically assessed patient, and per poison control no evaluations can be recommended until patient has been examined

## 2020-10-04 NOTE — ED TRIAGE NOTES
Patient with dementia arrives with  after biting dish pod. Brought dish pod with him. Has residue around mouth in triage.

## 2020-10-04 NOTE — ED PROVIDER NOTES
68-year-old female with history of advanced dementia presents with her  after she unintentionally bit into a  detergent tab. Consisted of some liquid portions as well as a large powdered portion. The liquid portion remains intact in the emergency room. The powder portion was broken. The report history history is provided by the  at bedside. He tells me that he saw her coughing and gagging, he immediately went to her and delivered a couple of back blows. He then discovered that she had bitten into the detergent tab. The patient has steadily improved since the incident, which occurred at approximately 1945 this evening. The history is provided by the spouse. The history is limited by the condition of the patient. Other   This is a new problem. The current episode started 1 to 2 hours ago. The problem occurs constantly. The problem has been rapidly improving. Past Medical History:   Diagnosis Date    Alzheimer's dementia ([facility] [address]     moderate. clinical trial 6/2017-6/2017 [facility].  stopped aricept fall 2018    Chronic back pain     lumbar. Dr. Nitza Tarango in [facility] 12/2018    Dehydration     History of essential hypertension     History of subarachnoid hemorrhage 11/24/2019    Lumbar stenosis 2011    moderate to severe w andrew [facility] MRI 12/4/18.  surgery 2011    Normal cardiac stress test 07/2017    PVCs (premature ventricular contractions)     Retinal disease     Dr. Luis Bal Subdural hematoma ([facility] [address] 2012, 11/24/19    Upper GI bleed 11/12/2018    presumed ulcer.  no endscopy due to risk       Past Surgical History:   Procedure Laterality Date    HX COLONOSCOPY  01/2010    normal    HX COLONOSCOPY  09/08/2006    polyp    HX HEENT      vitrectomy    HX LUMBAR LAMINECTOMY  2011    HX ORTHOPAEDIC      hammertoe repair    HX TUBAL LIGATION      HX UROLOGICAL      bladder suspension         Family History:   Problem Relation Age of Onset    Lung Disease Mother  Hypertension Mother     Lung Disease Father    [de-identified] Arthritis-osteo Sister     No Known Problems Brother     No Known Problems Daughter     No Known Problems Daughter        Social History     Socioeconomic History    Marital status:      Spouse name: Not on file    Number of children: Not on file    Years of education: Not on file    Highest education level: Not on file   Occupational History    Not on file   Social Needs    Financial resource strain: Not on file    Food insecurity     Worry: Not on file     Inability: Not on file   [employer] needs     Medical: Not on file     Non-medical: Not on file   Tobacco Use    Smoking status: Former Smoker    Smokeless tobacco: Never Used   Substance and Sexual Activity    Alcohol use: Yes     Comment: occaisional    Drug use: No    Sexual activity: Not on file   Lifestyle    Physical activity     Days per week: Not on file     Minutes per session: Not on file    Stress: Not on file   Relationships    Social connections     Talks on phone: Not on file     Gets together: Not on file     Attends [employer] service: Not on file     Active member of club or organization: Not on file     Attends meetings of clubs or organizations: Not on file     Relationship status: Not on file    Intimate partner violence     Fear of current or ex partner: Not on file     Emotionally abused: Not on file     Physically abused: Not on file     Forced sexual activity: Not on file   Other Topics Concern    Not on file   Social History Narrative    Not on file         ALLERGIES: Oxycodone    Review of Systems   Unable to perform ROS: Dementia       Vitals:    10/03/20 2005   BP: 139/89   Pulse: 72   Resp: 17   Temp: 98.9 °F (37.2 °C)   SpO2: 98%   Weight: 55.6 kg (122 lb 9.2 oz)   Height: 5' 7\" (1.702 m)            Physical Exam  Vitals signs and nursing note reviewed. Constitutional:       General: She is not in acute distress.      Appearance: Normal appearance. She is normal weight. She is not ill-appearing or toxic-appearing. HENT:      Head: Normocephalic and atraumatic. Nose: Nose normal.      Mouth/Throat:      Mouth: Mucous membranes are moist.      Pharynx: Oropharynx is clear. No oropharyngeal exudate or posterior oropharyngeal erythema. Eyes:      Extraocular Movements: Extraocular movements intact. Pupils: Pupils are equal, round, and reactive to light. Neck:      Musculoskeletal: Normal range of motion and neck supple. Cardiovascular:      Rate and Rhythm: Normal rate and regular rhythm. Pulses: Normal pulses. Heart sounds: Normal heart sounds. Pulmonary:      Effort: Pulmonary effort is normal.      Breath sounds: Normal breath sounds. Abdominal:      Palpations: Abdomen is soft. Tenderness: There is no abdominal tenderness. Musculoskeletal: Normal range of motion. Right lower leg: No edema. Left lower leg: No edema. Skin:     General: Skin is warm and dry. Capillary Refill: Capillary refill takes less than 2 seconds. Neurological:      Mental Status: Mental status is at baseline. MDM  Number of Diagnoses or Management Options  Irritant contact dermatitis due to detergent:   Diagnosis management comments: 70-year-old female presents as above after unintentional biting of a detergent pack. She is no evidence of injury on her exam.  She was observed for a period in the emergency department with adverse effect. She was p.o. tolerant in the emergency department as well. Plan to discharge with instructions to follow-up with primary care and return if needed.          Procedures

## 2020-10-04 NOTE — ED NOTES
Bedside shift change report given to Arabella Rosas (oncoming nurse) by Susan Syed (offgoing nurse). Report included the following information SBAR, Kardex, ED Summary, [facility] and Recent Results.

## 2021-01-06 ENCOUNTER — OFFICE VISIT (OUTPATIENT)
Dept: INTERNAL MEDICINE CLINIC | Age: 81
End: 2021-01-06
Payer: MEDICARE

## 2021-01-06 VITALS
WEIGHT: 120 LBS | SYSTOLIC BLOOD PRESSURE: 170 MMHG | OXYGEN SATURATION: 98 % | DIASTOLIC BLOOD PRESSURE: 85 MMHG | BODY MASS INDEX: 18.83 KG/M2 | HEIGHT: 67 IN | HEART RATE: 64 BPM | RESPIRATION RATE: 12 BRPM

## 2021-01-06 DIAGNOSIS — R06.09 DOE (DYSPNEA ON EXERTION): Primary | ICD-10-CM

## 2021-01-06 DIAGNOSIS — I95.1 ORTHOSTATIC HYPOTENSION: ICD-10-CM

## 2021-01-06 PROCEDURE — G8432 DEP SCR NOT DOC, RNG: HCPCS | Performed by: INTERNAL MEDICINE

## 2021-01-06 PROCEDURE — 99214 OFFICE O/P EST MOD 30 MIN: CPT | Performed by: INTERNAL MEDICINE

## 2021-01-06 PROCEDURE — G8399 PT W/DXA RESULTS DOCUMENT: HCPCS | Performed by: INTERNAL MEDICINE

## 2021-01-06 PROCEDURE — G8427 DOCREV CUR MEDS BY ELIG CLIN: HCPCS | Performed by: INTERNAL MEDICINE

## 2021-01-06 PROCEDURE — 1100F PTFALLS ASSESS-DOCD GE2>/YR: CPT | Performed by: INTERNAL MEDICINE

## 2021-01-06 PROCEDURE — 3288F FALL RISK ASSESSMENT DOCD: CPT | Performed by: INTERNAL MEDICINE

## 2021-01-06 PROCEDURE — G8420 CALC BMI NORM PARAMETERS: HCPCS | Performed by: INTERNAL MEDICINE

## 2021-01-06 PROCEDURE — G8536 NO DOC ELDER MAL SCRN: HCPCS | Performed by: INTERNAL MEDICINE

## 2021-01-06 PROCEDURE — 1090F PRES/ABSN URINE INCON ASSESS: CPT | Performed by: INTERNAL MEDICINE

## 2021-01-06 RX ORDER — LANOLIN ALCOHOL/MO/W.PET/CERES
1000 CREAM (GRAM) TOPICAL DAILY
COMMUNITY
End: 2021-09-27 | Stop reason: SDUPTHER

## 2021-01-06 NOTE — PROGRESS NOTES
HISTORY OF PRESENT ILLNESS    Chief Complaint   Patient presents with    Fatigue     After long walks. Presents for follow-up  Presents with her . Patient is unable to provide any history secondary to dementia. She mumbles and makes noises which are not intelligible words.  has noticed that she has become more exhausted after long walks. This only occurs when they walk 1 to 2 miles. She has at times rush to sit down after these walks, and appears a bit winded. She does not appear to be in great distress during the walk itself, however. Patient has never exhibited any signs of chest pain, edema or cough. She is not short of breath at night.  has been been checking her BP and HR with a home monitor. Her BP has ranged from a high of  157/102 to a low of 108/72 and her HR from 72 to 58, since 9/20. Many of her BP and  HR readings are normal.  No dark stools. She does have a history of orthostatic hypotension and has been taking midodrine 2.5 mg daily. She also does have a history of borderline hypertension. As EKG September 2018 showed sinus arrhythmia. She has no known history of other arrhythmias. Does have a history of PVCs. She did have a stress test in July 2017 which showed normal structural heart. Review of Systems   All other systems reviewed and are negative, except as noted in HPI    Past Medical and Surgical History   has a past medical history of Alzheimer's dementia ([address] Chronic back pain, Dehydration, History of essential hypertension, History of subarachnoid hemorrhage (11/24/2019), Lumbar stenosis (2011), Normal cardiac stress test (07/2017), PVCs (premature ventricular contractions), Retinal disease, Subdural hematoma ([address] (2012, 11/24/19), and Upper GI bleed (11/12/2018).    has a past surgical history that includes hx lumbar laminectomy (2011); hx orthopaedic; hx heent; hx tubal ligation; hx urological; hx colonoscopy (01/2010); and hx colonoscopy (09/08/2006). reports that she has quit smoking. She has never used smokeless tobacco. She reports current alcohol use. She reports that she does not use drugs. family history includes Arthritis-osteo in her sister; Hypertension in her mother; Lung Disease in her father and mother; No Known Problems in her brother, daughter, and daughter. Physical Exam   Nursing note and vitals reviewed. Blood pressure (!) 170/85, pulse 64, resp. rate 12, height 5' 7\" (1.702 m), weight 120 lb (54.4 kg), SpO2 98 %. Constitutional:  No distress. Eyes: Conjunctivae are normal.   Ears:  Hearing grossly intact  Cardiovascular: Normal rate. regular rhythm, no murmurs or gallops  No edema  Pulmonary/Chest: Effort normal.   CTAB  Musculoskeletal: moves all 4 extremities   Neurological:   Skin: No rash noted. Psychiatric: Appears somewhat anxious, but is calmed by her . Good eye contact. .   Unintelligible word. ASSESSMENT and PLAN  Diagnoses and all orders for this visit:    1. GIVENS (dyspnea on exertion)  Dyspnea on exertion which is only with significant exertion. No other overt cardiac symptoms the patient is a very poor historian. Normal stress test 4 years ago is reassuring. No overt symptoms of anemia and her conjunctiva are red with no reported change in stool color. No obvious pulmonary findings and pulse ox and lung exam are normal.  I suspect that her blood pressure could be accelerating during exercise and this could be causing some cardiovascular strain leading to her shortness of breath. I do recommend stopping midodrine which she has been taking for orthostatic hypotension and monitoring. Could consider work-up for arrhythmia with a Holter monitor, but patient may not tolerate it and may pull it off. Could consider chest x-ray, other stress test, lab work including repeat CBC but no overt signs and symptoms to warrant at this point.    will contact me with updates and [facility] Follow-up visit if change in symptomatology. 2. Orthostatic hypotension  Blood pressure is high. Stop midodrine for now and monitor. See above.      lab results and schedule of future lab studies reviewed with patient  reviewed medications and side effects in detail    Return to clinic for further evaluation if new symptoms develop        Current Outpatient Medications   Medication Sig    cyanocobalamin (Vitamin B-12) 1,000 mcg tablet Take 1,000 mcg by mouth daily.  midodrine (PROAMATINE) 2.5 mg tablet TAKE 1 TAB BY MOUTH TWO (2) TIMES A DAY. (Patient taking differently: daily.)    polyethylene glycol (MIRALAX) 17 gram/dose powder Take 17 g by mouth daily.  cholecalciferol, vitamin D3, (VITAMIN D3) 2,000 unit tab Take 1 Tab by mouth daily. Take  by mouth.  traZODone (DESYREL) 50 mg tablet Take 50 mg by mouth as needed for Sleep. No current facility-administered medications for this visit.

## 2021-05-12 DIAGNOSIS — Z91.81 AT HIGH RISK FOR FALLS: ICD-10-CM

## 2021-05-12 DIAGNOSIS — G89.29 CHRONIC BILATERAL LOW BACK PAIN, UNSPECIFIED WHETHER SCIATICA PRESENT: Primary | ICD-10-CM

## 2021-05-12 DIAGNOSIS — R29.3 POSTURE IMBALANCE: ICD-10-CM

## 2021-05-12 DIAGNOSIS — M54.50 CHRONIC BILATERAL LOW BACK PAIN, UNSPECIFIED WHETHER SCIATICA PRESENT: Primary | ICD-10-CM

## 2021-05-12 DIAGNOSIS — Z91.81 AT LOW RISK FOR FALL: ICD-10-CM

## 2021-05-19 ENCOUNTER — TELEPHONE (OUTPATIENT)
Dept: INTERNAL MEDICINE CLINIC | Age: 81
End: 2021-05-19

## 2021-05-19 NOTE — TELEPHONE ENCOUNTER
Spoke with David Gregg at [facility] regarding referral for PT. Patient requested start of service to begin on 5/21/21. Verbal order give for new start of care date.

## 2021-05-19 NOTE — TELEPHONE ENCOUNTER
----- Message from [facility] sent at 5/19/2021 12:00 PM EDT -----  Regarding: DR. STANTON/TELEPHONE  General Message/Vendor Calls    Caller's first and last name: Eduardoalbafitz Meyers      Reason for call: LETTING PROVIDER [address] [place] [address] 5/21 SERVICES WILL BEGIN      Callback required yes/no and why: YES      Best contact number(s): [phone]      Details to clarify the request: [address] [place] [address]

## 2021-05-20 ENCOUNTER — TELEPHONE (OUTPATIENT)
Dept: INTERNAL MEDICINE CLINIC | Age: 81
End: 2021-05-20

## 2021-05-20 NOTE — TELEPHONE ENCOUNTER
Julio C Fuentes from [facility] called to request latest face to face visit notes but patient has not been seen in the office since January of 2021 - they are going to be cancelling her service until patient can be seen in office. The visit notes must be within 30 days.

## 2021-05-21 NOTE — TELEPHONE ENCOUNTER
I scheduled patient to come in as soon as possible because she cannot start any therapy until this is complete.  confirmed Tuesday does work for them both. He did not know why she could not start therapy & was upset because he had gotten a call yesterday saying she could start today then did not get a call back from them stating it wasn't allowed.

## 2021-05-25 ENCOUNTER — OFFICE VISIT (OUTPATIENT)
Dept: INTERNAL MEDICINE CLINIC | Age: 81
End: 2021-05-25
Payer: MEDICARE

## 2021-05-25 VITALS
WEIGHT: 123 LBS | HEIGHT: 67 IN | HEART RATE: 62 BPM | SYSTOLIC BLOOD PRESSURE: 96 MMHG | OXYGEN SATURATION: 98 % | RESPIRATION RATE: 13 BRPM | DIASTOLIC BLOOD PRESSURE: 56 MMHG | BODY MASS INDEX: 19.3 KG/M2

## 2021-05-25 DIAGNOSIS — G89.29 CHRONIC BILATERAL LOW BACK PAIN, UNSPECIFIED WHETHER SCIATICA PRESENT: Primary | ICD-10-CM

## 2021-05-25 DIAGNOSIS — R26.89 IMBALANCE: ICD-10-CM

## 2021-05-25 DIAGNOSIS — F02.818 ALZHEIMER'S DEMENTIA OF OTHER ONSET WITH BEHAVIORAL DISTURBANCE: ICD-10-CM

## 2021-05-25 DIAGNOSIS — M54.50 CHRONIC BILATERAL LOW BACK PAIN, UNSPECIFIED WHETHER SCIATICA PRESENT: Primary | ICD-10-CM

## 2021-05-25 DIAGNOSIS — G30.8 ALZHEIMER'S DEMENTIA OF OTHER ONSET WITH BEHAVIORAL DISTURBANCE: ICD-10-CM

## 2021-05-25 PROCEDURE — G8427 DOCREV CUR MEDS BY ELIG CLIN: HCPCS | Performed by: INTERNAL MEDICINE

## 2021-05-25 PROCEDURE — G8536 NO DOC ELDER MAL SCRN: HCPCS | Performed by: INTERNAL MEDICINE

## 2021-05-25 PROCEDURE — G8399 PT W/DXA RESULTS DOCUMENT: HCPCS | Performed by: INTERNAL MEDICINE

## 2021-05-25 PROCEDURE — G8510 SCR DEP NEG, NO PLAN REQD: HCPCS | Performed by: INTERNAL MEDICINE

## 2021-05-25 PROCEDURE — 99213 OFFICE O/P EST LOW 20 MIN: CPT | Performed by: INTERNAL MEDICINE

## 2021-05-25 PROCEDURE — 1090F PRES/ABSN URINE INCON ASSESS: CPT | Performed by: INTERNAL MEDICINE

## 2021-05-25 PROCEDURE — 1101F PT FALLS ASSESS-DOCD LE1/YR: CPT | Performed by: INTERNAL MEDICINE

## 2021-05-25 PROCEDURE — G8420 CALC BMI NORM PARAMETERS: HCPCS | Performed by: INTERNAL MEDICINE

## 2021-05-26 NOTE — PROGRESS NOTES
HISTORY OF PRESENT ILLNESS    Chief Complaint   Patient presents with    Physical Therapy     Request - office notes needed. Hx provided by  due to severe dementia. She is able to make good eye contact, but mumbles unintelligibly. She has been leaning to the right over the past week. Occurs more often when she is walking. She has a past hx of this and it improved w PT. She has a hx of lumbar laminectomy. She had chronic pain, but this somehow improved and she does not appear to be in pain, per . No known injuries.  did find her on the floor sitting next to the couch 3 days ago, but thinks she slid off. Review of Systems   All other systems reviewed and are negative, except as noted in HPI    Past Medical and Surgical History   has a past medical history of Alzheimer's dementia ([address] Chronic back pain, Dehydration, History of essential hypertension, History of subarachnoid hemorrhage (11/24/2019), Lumbar stenosis (2011), Normal cardiac stress test (07/2017), PVCs (premature ventricular contractions), Retinal disease, Subdural hematoma ([address] (2012, 11/24/19), and Upper GI bleed (11/12/2018). has a past surgical history that includes hx lumbar laminectomy (2011); hx orthopaedic; hx heent; hx tubal ligation; hx urological; hx colonoscopy (01/2010); and hx colonoscopy (09/08/2006). reports that she has quit smoking. She has never used smokeless tobacco. She reports current alcohol use. She reports that she does not use drugs. family history includes Arthritis-osteo in her sister; Hypertension in her mother; Lung Disease in her father and mother; No Known Problems in her brother, daughter, and daughter. Physical Exam   Nursing note and vitals reviewed. Blood pressure (!) 96/56, pulse 62, resp. rate 13, height 5' 7\" (1.702 m), weight 123 lb (55.8 kg), SpO2 98 %. Constitutional: In no distress.     Eyes: Conjunctivae are normal.  HEENT:  No LAD or thyromegaly Cardiovascular: Normal rate. regular rhythm. No murmurs  No edema  Pulmonary/Chest: Effort normal. clear to ausculation blaterally  Musculoskeletal:  no edema. Standing and leaning to right. Left up higher  FROM of back with no severe tenderness. Hips with mild decreased internal rotation  Abd:  Neurological: Alert and oriented. Grossly intact cranial nerves and motor function. Skin: No visible rash noted. Psychiatric: directalbe, but unable to communicate     ASSESSMENT and PLAN  Diagnoses and all orders for this visit:    1. Chronic bilateral low back pain, unspecified whether sciatica present  2. Imbalance  3. Alzheimer's dementia of other onset with behavioral disturbance ([facility]  Postural change. Does not appear to be in significant pain. Recommend PT which seemed to help in the past.   She may not be able to recall details of PT, but gait and balance practice likely will prevent falls and improve posture. Refer to Home PT with [facility]. .  Gait impairment, imbalance, postural change in patient with dementia  Did well with home PT previously. Fax to Neal Cheney [phone]    lab results and schedule of future lab studies reviewed with patient  reviewed medications and side effects in detail    Return to clinic for further evaluation if new symptoms develop or if current symptoms worsen or fail to resolve. There are no Patient Instructions on file for this visit.

## 2021-06-01 ENCOUNTER — TELEPHONE (OUTPATIENT)
Dept: INTERNAL MEDICINE CLINIC | Age: 81
End: 2021-06-01

## 2021-06-01 NOTE — TELEPHONE ENCOUNTER
Per request from nurse Flo Carrel at [facility], this nurse faxed last office note attn Flo Carrel (fax# [phone]). Fax result report shows transmission was successful.

## 2021-06-01 NOTE — TELEPHONE ENCOUNTER
----- Message from Arturo Bueno sent at 6/1/2021  8:38 AM EDT -----  Regarding: telephone  General Message/Vendor Calls    Caller's first and last name: Joi Teri with [facility]       Reason for call: please fax office note to [phone] ([facility]), needs office note to obtain home health. unable to start until this is completed.       Callback required yes/no and why: Yes to advise that the fax was       Best contact number(s):  [phone]      Details to clarify the request: n/a       Arturo Bueno

## 2021-06-04 ENCOUNTER — TELEPHONE (OUTPATIENT)
Dept: INTERNAL MEDICINE CLINIC | Age: 81
End: 2021-06-04

## 2021-06-04 NOTE — TELEPHONE ENCOUNTER
FYI: [facility] called to tell us that patients  notified them patient has been admitted into the hospital [unsure of which one] but they were unable to complete her intake today.

## 2021-06-07 ENCOUNTER — TELEPHONE (OUTPATIENT)
Dept: INTERNAL MEDICINE CLINIC | Age: 81
End: 2021-06-07

## 2021-06-07 NOTE — TELEPHONE ENCOUNTER
[facility] called saying that patient will be starting TOMORROW since being discharged from the hospital - making sure this date is okay with Dr [facility].

## 2021-06-28 ENCOUNTER — TELEPHONE (OUTPATIENT)
Dept: INTERNAL MEDICINE CLINIC | Age: 81
End: 2021-06-28

## 2021-06-28 RX ORDER — CEPHALEXIN 500 MG/1
500 CAPSULE ORAL 3 TIMES DAILY
Qty: 21 CAPSULE | Refills: 0 | Status: SHIPPED | OUTPATIENT
Start: 2021-06-28 | End: 2021-09-27 | Stop reason: ALTCHOICE

## 2021-06-28 NOTE — TELEPHONE ENCOUNTER
Pt scheduled to come in today to see Dr Jose M Knox, due to no availability with Dr Flores Henri[address]. ........  see previous message

## 2021-06-28 NOTE — TELEPHONE ENCOUNTER
Per patient's  , patient recently fell on a concrete  sidewalk , still has some redness and swelling, on area that is white, is of concern. Needing PCP to inspect the wound.    Requesting to speak directly with the nurse    He can be reached at [phone]

## 2021-06-28 NOTE — TELEPHONE ENCOUNTER
Thank you. I thought she was seeing Jose Miguel Reyes today for the wound and was going to defer treatment of the wound to her, but it appears that was cancelled. I agree there is concern for infection/cellulitis on the photo. I dont want this to fester until 7/2. Let  know I sent in keflex antibiotic as well.

## 2021-07-01 ENCOUNTER — TELEPHONE (OUTPATIENT)
Dept: INTERNAL MEDICINE CLINIC | Age: 81
End: 2021-07-01

## 2021-07-01 NOTE — TELEPHONE ENCOUNTER
Patient with severe dementia and may be susceptible to confusion fatigue. That said, if her behavior is markedly different, she should be checked out. If she is having a fever greater than 100.5 or if her foot is looking much redder or streaking up her leg, she should go to the hospital.  It is reasonable for dispatch health to check her out. R/o UTI. If she is truly difficult to around however, she should go to the emergency room for further work-up.

## 2021-07-01 NOTE — TELEPHONE ENCOUNTER
Incoming call from  with concerns of wife. HIPPA verified.  stated that patient is feeling very tired today and not her normal self , walking slow and falling asleep a lot. Patient vitals today was 131/88 HR of 58 at 830a rechecked again and was 121/79 HR 62. Patient  stated that she is taking the abx that was prescribed. While patient was on the phone with concerns I checked providers schedule with nothing available also checked NP schedule nothing available. I advised patient to call dispatch health with number and if dispatch health unable to come see patient today to go to ER if health changes get worse. Patient has hospitalization f/u with provider 7/2/21. Please advise with any other recommendations.

## 2021-07-02 ENCOUNTER — OFFICE VISIT (OUTPATIENT)
Dept: INTERNAL MEDICINE CLINIC | Age: 81
End: 2021-07-02
Payer: MEDICARE

## 2021-07-02 VITALS
OXYGEN SATURATION: 98 % | HEIGHT: 64 IN | TEMPERATURE: 97.7 F | HEART RATE: 63 BPM | RESPIRATION RATE: 14 BRPM | DIASTOLIC BLOOD PRESSURE: 72 MMHG | BODY MASS INDEX: 20.49 KG/M2 | SYSTOLIC BLOOD PRESSURE: 115 MMHG | WEIGHT: 120 LBS

## 2021-07-02 DIAGNOSIS — G30.8 ALZHEIMER'S DEMENTIA OF OTHER ONSET WITH BEHAVIORAL DISTURBANCE: Primary | ICD-10-CM

## 2021-07-02 DIAGNOSIS — F02.818 ALZHEIMER'S DEMENTIA OF OTHER ONSET WITH BEHAVIORAL DISTURBANCE: Primary | ICD-10-CM

## 2021-07-02 DIAGNOSIS — Z86.69 H/O ABSENCE SEIZURES: ICD-10-CM

## 2021-07-02 DIAGNOSIS — L03.115 CELLULITIS OF RIGHT LOWER EXTREMITY: ICD-10-CM

## 2021-07-02 PROCEDURE — G8536 NO DOC ELDER MAL SCRN: HCPCS | Performed by: INTERNAL MEDICINE

## 2021-07-02 PROCEDURE — 99213 OFFICE O/P EST LOW 20 MIN: CPT | Performed by: INTERNAL MEDICINE

## 2021-07-02 PROCEDURE — G8510 SCR DEP NEG, NO PLAN REQD: HCPCS | Performed by: INTERNAL MEDICINE

## 2021-07-02 PROCEDURE — G8399 PT W/DXA RESULTS DOCUMENT: HCPCS | Performed by: INTERNAL MEDICINE

## 2021-07-02 PROCEDURE — G8420 CALC BMI NORM PARAMETERS: HCPCS | Performed by: INTERNAL MEDICINE

## 2021-07-02 PROCEDURE — 1101F PT FALLS ASSESS-DOCD LE1/YR: CPT | Performed by: INTERNAL MEDICINE

## 2021-07-02 PROCEDURE — G8427 DOCREV CUR MEDS BY ELIG CLIN: HCPCS | Performed by: INTERNAL MEDICINE

## 2021-07-02 PROCEDURE — 1090F PRES/ABSN URINE INCON ASSESS: CPT | Performed by: INTERNAL MEDICINE

## 2021-07-02 NOTE — PROGRESS NOTES
HISTORY OF PRESENT ILLNESS    Chief Complaint   Patient presents with   [address] [place] [address]     three weeks ago - Vasovagal syncope fainted - wound will not heal right sharon        Presents for follow-up  She is accompanied by her  and her aide. Patient provides no history because of severe dementia. Admitted to [facility] for presyncope. Discharge summary noted. She will have 2 presyncopal episodes with no recurrence. Was extensively evaluated. Head CT showed no evidence of stroke. She does have subtle encephalomalacia in the left temporal lobe and cerebral volume loss. Right anterior frontal and temporal lobes, slightly progressed compared to CT scan December 2019. Intrasellar mass with suprasellar extension, stable from prior CT scans, likely representing pituitary macroadenoma. Chest x-ray showed mild hypoventilation with vascular crowding and gland groundglass opacities unchanged compared to December 2019. Echocardiogram revealed no significant findings with ejection fraction 60 to 65% and normal wall motion. .  Infectious work-up was negative. Labs showed normal urinalysis, normal electrolytes with creatinine 0.7, coags normal, normal CBC with white blood cell count 8.7 and hemoglobin 14.4. Troponin negative and normal.  Influenza screen negative. Given hydration and sent home. Has had no further episodes. Had some episodes where she appears to be staring off and is hard to stimulate. She does not appear in any distress during those times. This happened the other day and they called dispatch health to evaluate and her vital signs were found all to be stable. She was back to baseline later on. She did slip and fall on the sidewalk on June 24. Her right ankle was scraped.  sent me a photo on 29 that looked like a cellulitis developing so she is taking Keflex.   It appears to be healing without any significant pain, drainage or streaking. Review of Systems   All other systems reviewed and are negative, except as noted in HPI    Past Medical and Surgical History   has a past medical history of Alzheimer's dementia ([address] Chronic back pain, Dehydration, History of essential hypertension, History of subarachnoid hemorrhage (11/24/2019), Lumbar stenosis (2011), Normal cardiac stress test (07/2017), Pituitary macroadenoma with extrasellar extension ([address] PVCs (premature ventricular contractions), Retinal disease, Subdural hematoma ([address] (2012, 11/24/19), and Upper GI bleed (11/12/2018). has a past surgical history that includes hx lumbar laminectomy (2011); hx orthopaedic; hx heent; hx tubal ligation; hx urological; hx colonoscopy (01/2010); and hx colonoscopy (09/08/2006). reports that she has quit smoking. She has never used smokeless tobacco. She reports current alcohol use. She reports that she does not use drugs. family history includes Arthritis-osteo in her sister; Hypertension in her mother; Lung Disease in her father and mother; No Known Problems in her brother, daughter, and daughter. Physical Exam   Nursing note and vitals reviewed. Blood pressure 115/72, pulse 63, temperature 97.7 °F (36.5 °C), temperature source Temporal, resp. rate 14, height 5' 4\" (1.626 m), weight 120 lb (54.4 kg), SpO2 98 %. Constitutional:  No distress. Eyes: Conjunctivae are normal.   Ears:  Hearing grossly intact  Cardiovascular: Normal rate. regular rhythm, no murmurs or gallops  No edema  Pulmonary/Chest: Effort normal.   CTAB  Musculoskeletal: moves all 4 extremities   Neurological: Alert and oriented to person, place, and time. Skin: No visible rash noted. Psychiatric: Normal mood and affect. Behavior is normal.     ASSESSMENT and PLAN  Diagnoses and all orders for this visit:    1. Alzheimer's dementia of other onset with behavioral disturbance ([address]  Requires total care.  and care aide are very supportive.     2. H/O absence seizures? Staring episodes may be seizure-like. .  They do see neurologist   Aggressive work-up is not warranted absolutely needed at this time. Supportive care. .   agrees. 3.  Cellulitis right ankle. Healing with Keflex. Continue applying antibiotic ointment as well.      lab results and schedule of future lab studies reviewed with patient  reviewed medications and side effects in detail    Return to clinic for further evaluation if new symptoms develop        Current Outpatient Medications   Medication Sig    cephALEXin (KEFLEX) 500 mg capsule Take 1 Capsule by mouth three (3) times daily.  wheat dextrin/calc gluc,lact (BENEFIBER PLUS CALCIUM PO) Take  by mouth.  cyanocobalamin (Vitamin B-12) 1,000 mcg tablet Take 1,000 mcg by mouth daily.  polyethylene glycol (MIRALAX) 17 gram/dose powder Take 17 g by mouth daily.  cholecalciferol, vitamin D3, (VITAMIN D3) 2,000 unit tab Take 1 Tab by mouth daily. Take  by mouth.  traZODone (DESYREL) 50 mg tablet Take 50 mg by mouth as needed for Sleep. No current facility-administered medications for this visit.    With a good chronic

## 2021-09-16 ENCOUNTER — TELEPHONE (OUTPATIENT)
Dept: INTERNAL MEDICINE CLINIC | Age: 81
End: 2021-09-16

## 2021-09-16 NOTE — TELEPHONE ENCOUNTER
----- Message from CHANTE BONNER sent at 9/15/2021  4:05 PM EDT -----  Regarding: Dr. Snyder Postin telephone  Contact: [phone]  General Message/Vendor Calls    Caller's first and last name: Clark Rodas  Daughter       Reason for call: Patient needs confirmation to have daughter represent for appointments looking to be Power of . Callback required yes/no and why: yes, information needed.       Best contact number(s): [phone]      Details to clarify the request: n/a      CHANTE BONNER

## 2021-09-16 NOTE — TELEPHONE ENCOUNTER
Called spoke with there daughter Izabel Javier who states her and her sister are there parents POA , informed them that they just need to bring in the form for mom and dad so we can upload and update chart. Izabel Javier  verbalized understanding of information discussed w/ no further questions at this time.

## 2021-09-20 ENCOUNTER — TELEPHONE (OUTPATIENT)
Dept: INTERNAL MEDICINE CLINIC | Age: 81
End: 2021-09-20

## 2021-09-20 NOTE — TELEPHONE ENCOUNTER
Patient's daughter Alvin Shea would like to know if her mother can be worked in for a physical this week as she needs one for her intake to memory care. They will not admit her without the entry physical. PSR cannot find anything this week or within 30 days like she requested.

## 2021-09-21 NOTE — TELEPHONE ENCOUNTER
PSR called back to [facility] for the patient's daughter to let her know that her mother had been scheduled and to call back if this time did not work for her.

## 2021-09-22 ENCOUNTER — TELEPHONE (OUTPATIENT)
Dept: INTERNAL MEDICINE CLINIC | Age: 81
End: 2021-09-22

## 2021-09-22 NOTE — TELEPHONE ENCOUNTER
Pt's daughter left forms for her mother's appointment on 9/27 and I placed it in [facility] [address]. Please call pt's  Galilea Lamb [phone] if you have any questions.      Thank you

## 2021-09-22 NOTE — TELEPHONE ENCOUNTER
I spoke with patients daughter, Nasreen Barraza who is on hipaa form to advise that physical prior to entrance to assistant living facilities need to be done with pts pcp. Appt has been moved to PCP's schedule for Monday 9/27/21 11:20am. Daughter was thankful for the call and appt with pcp.

## 2021-09-27 ENCOUNTER — HOSPITAL ENCOUNTER (OUTPATIENT)
Dept: GENERAL RADIOLOGY | Age: 81
Discharge: HOME | End: 2021-09-27
Payer: MEDICARE

## 2021-09-27 ENCOUNTER — OFFICE VISIT (OUTPATIENT)
Dept: INTERNAL MEDICINE CLINIC | Age: 81
End: 2021-09-27
Payer: MEDICARE

## 2021-09-27 VITALS
RESPIRATION RATE: 13 BRPM | WEIGHT: 117 LBS | BODY MASS INDEX: 19.97 KG/M2 | DIASTOLIC BLOOD PRESSURE: 63 MMHG | HEART RATE: 54 BPM | SYSTOLIC BLOOD PRESSURE: 112 MMHG | HEIGHT: 64 IN

## 2021-09-27 DIAGNOSIS — Z11.1 SCREENING FOR TUBERCULOSIS: ICD-10-CM

## 2021-09-27 DIAGNOSIS — I95.1 ORTHOSTATIC HYPOTENSION: ICD-10-CM

## 2021-09-27 DIAGNOSIS — Z00.00 MEDICARE ANNUAL WELLNESS VISIT, SUBSEQUENT: Primary | ICD-10-CM

## 2021-09-27 DIAGNOSIS — R13.11 DYSPHAGIA, ORAL PHASE: ICD-10-CM

## 2021-09-27 DIAGNOSIS — F99 INSOMNIA DUE TO OTHER MENTAL DISORDER: ICD-10-CM

## 2021-09-27 DIAGNOSIS — Z91.81 RISK FOR FALLS: ICD-10-CM

## 2021-09-27 DIAGNOSIS — G30.9 ALZHEIMER'S DEMENTIA WITH BEHAVIORAL DISTURBANCE, UNSPECIFIED TIMING OF DEMENTIA ONSET: ICD-10-CM

## 2021-09-27 DIAGNOSIS — F51.05 INSOMNIA DUE TO OTHER MENTAL DISORDER: ICD-10-CM

## 2021-09-27 DIAGNOSIS — F02.81 ALZHEIMER'S DEMENTIA WITH BEHAVIORAL DISTURBANCE, UNSPECIFIED TIMING OF DEMENTIA ONSET: ICD-10-CM

## 2021-09-27 DIAGNOSIS — R39.81 FUNCTIONAL URINARY INCONTINENCE: ICD-10-CM

## 2021-09-27 DIAGNOSIS — K59.09 CHRONIC CONSTIPATION: ICD-10-CM

## 2021-09-27 PROBLEM — F33.0 MAJOR DEPRESSIVE DISORDER, RECURRENT, MILD (HCC): Status: ACTIVE | Noted: 2021-09-27

## 2021-09-27 PROBLEM — F33.1 MAJOR DEPRESSIVE DISORDER, RECURRENT, MODERATE (HCC): Status: ACTIVE | Noted: 2021-09-27

## 2021-09-27 PROBLEM — F33.9 MAJOR DEPRESSIVE DISORDER, RECURRENT, UNSPECIFIED (HCC): Status: ACTIVE | Noted: 2021-09-27

## 2021-09-27 PROBLEM — F33.1 MAJOR DEPRESSIVE DISORDER, RECURRENT, MODERATE (HCC): Status: RESOLVED | Noted: 2021-09-27 | Resolved: 2021-09-27

## 2021-09-27 PROBLEM — F33.0 MAJOR DEPRESSIVE DISORDER, RECURRENT, MILD (HCC): Status: RESOLVED | Noted: 2021-09-27 | Resolved: 2021-09-27

## 2021-09-27 PROBLEM — F33.9 MAJOR DEPRESSIVE DISORDER, RECURRENT, UNSPECIFIED (HCC): Status: RESOLVED | Noted: 2021-09-27 | Resolved: 2021-09-27

## 2021-09-27 PROCEDURE — G8427 DOCREV CUR MEDS BY ELIG CLIN: HCPCS | Performed by: INTERNAL MEDICINE

## 2021-09-27 PROCEDURE — G0439 PPPS, SUBSEQ VISIT: HCPCS | Performed by: INTERNAL MEDICINE

## 2021-09-27 PROCEDURE — G8536 NO DOC ELDER MAL SCRN: HCPCS | Performed by: INTERNAL MEDICINE

## 2021-09-27 PROCEDURE — G8420 CALC BMI NORM PARAMETERS: HCPCS | Performed by: INTERNAL MEDICINE

## 2021-09-27 PROCEDURE — 1101F PT FALLS ASSESS-DOCD LE1/YR: CPT | Performed by: INTERNAL MEDICINE

## 2021-09-27 PROCEDURE — G8399 PT W/DXA RESULTS DOCUMENT: HCPCS | Performed by: INTERNAL MEDICINE

## 2021-09-27 PROCEDURE — 71046 X-RAY EXAM CHEST 2 VIEWS: CPT

## 2021-09-27 PROCEDURE — G8510 SCR DEP NEG, NO PLAN REQD: HCPCS | Performed by: INTERNAL MEDICINE

## 2021-09-27 RX ORDER — CHOLECALCIFEROL (VITAMIN D3) 125 MCG
1 CAPSULE ORAL DAILY
Qty: 30 TABLET | Refills: 5
Start: 2021-09-27

## 2021-09-27 RX ORDER — CHOLECALCIFEROL (VITAMIN D3) 25 MCG
2500 TABLET,CHEWABLE ORAL DAILY
Qty: 30 TABLET | Refills: 5
Start: 2021-09-27

## 2021-09-27 RX ORDER — TRAZODONE HYDROCHLORIDE 50 MG/1
50 TABLET ORAL
Qty: 30 TABLET | Refills: 3
Start: 2021-09-27 | End: 2022-03-25 | Stop reason: SDUPTHER

## 2021-09-27 RX ORDER — WHEAT DEXTRIN 3 G/4 G
3 POWDER (GRAM) ORAL DAILY
Qty: 100 G | Refills: 5
Start: 2021-09-27

## 2021-09-28 NOTE — PROGRESS NOTES
This is a Subsequent Medicare Annual Wellness Visit providing Personalized Prevention Plan Services (PPPS) (Performed 12 months after initial AWV and PPPS )    I have reviewed the patient's medical history in detail and updated the computerized patient record. Requires physical for nursing home for respite care while her , Nivia Mckeon, undergoes colon resection for a large colon mass, cancer. He is feeling well and is hopeful that it will not be metastatic. Patient is nonverbal, speaking in mostly gibberish and occasional nonsensical words. She appears comfortable today, sitting in the chair. She has a stuffed animal dog available but she does not require it for comfort. She makes good eye contact. Nivia Mckeon says that she is doing well, stable. She has Alzheimer's dementia with expressive aphasia. She has ongoing issues with dysphagia of large solid foods. She has issues remembering to use the bathroom, so wears depends, but does not wet them as long as she is reminded to use the bathroom every 2 hours or so. She does not have fecal incontinence very often as long as she is directed the toilet during the day. History of insomnia. She is taking trazodone as needed. Does not use it very often at home. She does have a history of rolling out of her bed, so Nivia Mckeon purchased a partial bed rail which has eliminated the need for any supervision at night. She will remain in bed in the morning until she is assisted. She does have a history of orthostatic hypotension and has had a couple mild falls. She requires some assistance with standing to make sure that she does not get up too quickly. History     Past Medical History:   Diagnosis Date    Alzheimer's dementia ([facility]     moderate. clinical trial 6/2017-6/2017 [facility].  stopped aricept fall 2018    Chronic back pain     lumbar.   Dr. Kenneth Talamantes in [facility] 12/2018    Dehydration     Dysphagia     needs foods cut up    Expressive aphasia     History of essential hypertension     History of subarachnoid hemorrhage 11/24/2019    Lumbar stenosis 2011    moderate to severe w [place] DJD MRI 12/4/18.  surgery 2011    Normal cardiac stress test 07/2017    Pituitary macroadenoma with extrasellar extension ([address]     stable 2019 to 6/2021    PVCs (premature ventricular contractions)     Retinal disease     Dr. Maliha Tabares Subdural hematoma ([address] 2012, 11/24/19    Upper GI bleed 11/12/2018    presumed ulcer. no endscopy due to risk    Urinary incontinence        Past Surgical History:   Procedure Laterality Date    HX COLONOSCOPY  01/2010    normal    HX COLONOSCOPY  09/08/2006    polyp    HX HEENT      vitrectomy    HX LUMBAR LAMINECTOMY  2011    HX ORTHOPAEDIC      hammertoe repair    HX TUBAL LIGATION      HX UROLOGICAL      bladder suspension       Current Outpatient Medications   Medication Sig    wheat dextrin (Benefiber Sugar Free, dextrin,) 3 gram/4 gram powd Take 3 Scoops by mouth daily. Indications: constipation    cyanocobalamin, vitamin B-12, 2,500 mcg tab tablet Take 1 Tablet by mouth daily. Indications: prevention of vitamin B12 deficiency    cholecalciferol, vitamin D3, (Vitamin D3) 50 mcg (2,000 unit) tab Take 1 Tablet by mouth daily.  traZODone (DESYREL) 50 mg tablet Take 1 Tablet by mouth nightly as needed for Sleep.  polyethylene glycol (MIRALAX) 17 gram/dose powder Take 17 g by mouth daily. No current facility-administered medications for this visit. Allergies   Allergen Reactions    Oxycodone Other (comments)     psychosis         Family History   Problem Relation Age of Onset    Lung Disease Mother     Hypertension Mother     Lung Disease Father    [employer] Arthritis-osteo Sister     No Known Problems Brother     No Known Problems Daughter     No Known Problems Daughter         reports that she has quit smoking. She has never used smokeless tobacco.   reports current alcohol use.       Depression Risk Factor Screening:       Alcohol Risk Factor Screening: On any occasion during the past 3 months, have you had more than 3 drinks containing alcohol? No    Do you average more than 14 drinks per week? No      Functional Ability and Level of Safety:     Hearing Loss   mild    Activities of Daily Living   Requires assistance for all activities of daily living. Requires assistance with standing to make sure she does not fall, requires reminders to use the bathroom, take medications and to prepare meals. Fall Risk     Fall Risk Assessment, last 12 mths 5/25/2021   Able to walk? Yes   Fall in past 12 months? 1   Do you feel unsteady? 1   Number of falls in past 12 months -   Fall with injury? -         Abuse Screen   Patient is not abused    Review of Systems   A comprehensive review of systems was negative except for that written in the HPI. Physical Examination     Evaluation of Cognitive Function:  Mood/affect:  neutral, happy  Appearance: age appropriate  Family member/caregiver input: none    Blood pressure 112/63, pulse (!) 54, resp. rate 13, height 5' 4\" (1.626 m), weight 117 lb (53.1 kg). General appearance: alert, cooperative, no distress, appears stated age  Neck: supple, symmetrical, trachea midline, no adenopathy, thyroid: not enlarged, symmetric, no tenderness/mass/nodules, no carotid bruit and no JVD  Lungs: clear to auscultation bilaterally  Heart: regular rate and rhythm, S1, S2 normal, no murmur, click, rub or gallop  Extremities: extremities normal, atraumatic, no cyanosis or edema    Patient Care Team:  Partha Roberts MD as PCP - General (Internal Medicine)  Partha Roberts MD as PCP - [facility] Empaneled Provider      Advice/Referrals/Counseling   Education and counseling provided. See below for specific orders    Assessment/Plan   Diagnoses and all orders for this visit:    1. Medicare annual wellness visit, subsequent  Up-to-date on preventative services.   Could consider Shingrix vaccination at some point.    2. Alzheimer's dementia with behavioral disturbance, unspecified timing of dementia onset ([address]  Requires constant supervision. But she is not a tremendous flight risk or a risk of significant agitation. 3. Screening for tuberculosis  Chest x-ray shows no evidence of pulmonary tuberculosis. She does have a small pleural effusion which we could consider repeating a chest x-ray in 3 months or consider a CT scan if increasing. She is asymptomatic from that perspective. -     XR CHEST PA LAT; Future  COVID-19 testing done at [facility], results pending. 4. Orthostatic hypotension  Requires encouragement for hydration. Some risk for fall. 5. Risk for falls  Benefit from physical therapy occupational consultation, nursing. 6. Dysphagia, oral phase  Chronic, secondary to Alzheimer's disease. Requires solid foods to be cut in smaller bite-size portions. Could benefit from speech pathology consultation. 7. Functional urinary incontinence  Requiring depends. Requires reminders to use the bathroom every 2-3 hours. 8. Insomnia due to other mental disorder  Continue may take trazodone for insomnia. She does fairly well at home without it, but could require a little bit more once the nursing facility. She requires bed rails to prevent falls by rolling out of bed. She has not attempted to crawl over bed rails at home. Letter drafted. -     traZODone (DESYREL) 50 mg tablet; Take 1 Tablet by mouth nightly as needed for Sleep. 9. Chronic constipation  Continue Benefiber and MiraLAX with good results  -     wheat dextrin (Benefiber Sugar Free, dextrin,) 3 gram/4 gram powd; Take 3 Scoops by mouth daily. Indications: constipation    Potential medication side effects were discussed with the patient; let me know if any occur.   Return for yearly Annual Wellness Visits

## 2021-11-03 ENCOUNTER — PATIENT MESSAGE (OUTPATIENT)
Dept: INTERNAL MEDICINE CLINIC | Age: 81
End: 2021-11-03

## 2021-11-03 DIAGNOSIS — G30.9 ALZHEIMER'S DEMENTIA WITH BEHAVIORAL DISTURBANCE, UNSPECIFIED TIMING OF DEMENTIA ONSET: Primary | ICD-10-CM

## 2021-11-03 DIAGNOSIS — Z91.81 RISK FOR FALLS: ICD-10-CM

## 2021-11-03 DIAGNOSIS — R26.89 IMBALANCE: ICD-10-CM

## 2021-11-03 DIAGNOSIS — F02.81 ALZHEIMER'S DEMENTIA WITH BEHAVIORAL DISTURBANCE, UNSPECIFIED TIMING OF DEMENTIA ONSET: Primary | ICD-10-CM

## 2021-11-04 ENCOUNTER — TELEPHONE (OUTPATIENT)
Dept: INTERNAL MEDICINE CLINIC | Age: 81
End: 2021-11-04

## 2021-11-04 NOTE — TELEPHONE ENCOUNTER
Edwige Ward from [address] [facility] called to state the patient will be starting care with them on 11/7. Edwige Ward is requesting the last office visit note associated with her need for home health to be faxed to [phone]. PSR will fax it now.

## 2021-11-09 ENCOUNTER — TELEPHONE (OUTPATIENT)
Dept: INTERNAL MEDICINE CLINIC | Age: 81
End: 2021-11-09

## 2021-11-09 NOTE — TELEPHONE ENCOUNTER
Sunrise with [facility] JASON says she saw the patient over the weekend for a PT assessment. She would like to know if the doctor is okay signing home health orders. She would like a verbal confirmation for her plan of care which is to see the patient twice a week for two weeks and once a week for 4 weeks. This is primarily for caregiver training and safety ideas to keep the patient strong and active.

## 2021-11-09 NOTE — TELEPHONE ENCOUNTER
Rtc to Sunrise from [facility]. [phone]. Left detailed VM regarding patient POC for therapy services. Requested that POC be faxed to our office.

## 2022-02-23 DIAGNOSIS — G30.9 ALZHEIMER DISEASE (HCC): ICD-10-CM

## 2022-02-23 DIAGNOSIS — R13.10 DYSPHAGIA, UNSPECIFIED TYPE: Primary | ICD-10-CM

## 2022-02-23 DIAGNOSIS — F02.80 ALZHEIMER DISEASE (HCC): ICD-10-CM

## 2022-03-06 ENCOUNTER — HOSPITAL ENCOUNTER (EMERGENCY)
Age: 82
Discharge: HOME | End: 2022-03-06
Attending: EMERGENCY MEDICINE
Payer: MEDICARE

## 2022-03-06 ENCOUNTER — APPOINTMENT (OUTPATIENT)
Dept: CT IMAGING | Age: 82
End: 2022-03-06
Attending: EMERGENCY MEDICINE
Payer: MEDICARE

## 2022-03-06 VITALS
HEIGHT: 64 IN | DIASTOLIC BLOOD PRESSURE: 70 MMHG | RESPIRATION RATE: 20 BRPM | HEART RATE: 95 BPM | SYSTOLIC BLOOD PRESSURE: 155 MMHG | WEIGHT: 117 LBS | TEMPERATURE: 97.1 F | BODY MASS INDEX: 19.97 KG/M2 | OXYGEN SATURATION: 98 %

## 2022-03-06 DIAGNOSIS — S01.01XA LACERATION OF SCALP, INITIAL ENCOUNTER: ICD-10-CM

## 2022-03-06 DIAGNOSIS — S09.90XA INJURY OF HEAD, INITIAL ENCOUNTER: Primary | ICD-10-CM

## 2022-03-06 PROCEDURE — 99284 EMERGENCY DEPT VISIT MOD MDM: CPT

## 2022-03-06 PROCEDURE — 75810000293 HC SIMP/SUPERF WND  RPR

## 2022-03-06 PROCEDURE — 70450 CT HEAD/BRAIN W/O DYE: CPT

## 2022-03-06 RX ORDER — LIDOCAINE HYDROCHLORIDE AND EPINEPHRINE 10; 10 MG/ML; UG/ML
1.5 INJECTION, SOLUTION INFILTRATION; PERINEURAL
Status: CANCELLED | OUTPATIENT
Start: 2022-03-06 | End: 2022-03-06

## 2022-03-06 RX ORDER — LIDOCAINE-EPINEPHRINE-TETRACAINE EXTERNAL SOLN 4-0.05-0.5% 4-0.05-0.5 %
2 SOLUTION TOPICAL
Status: DISCONTINUED | OUTPATIENT
Start: 2022-03-06 | End: 2022-03-06 | Stop reason: HOSPADM

## 2022-03-06 NOTE — ED PROVIDER NOTES
68-year-old female with history of advanced Alzheimer's who is presenting from home with her  after head injury.  was getting her dressed in the bathroom when she pulled backwards striking the back of her head on the edge of tile. No loss of consciousness. She did have a wound to her scalp with bleeding that was controlled with pressure. Denies any nausea vomiting headaches. History is obtained from the  as the patient has stress of aphasia, Alzheimer's, cannot really communicate. Past Medical History:   Diagnosis Date    Alzheimer's dementia ([address]     moderate. clinical trial 6/2017-6/2017 [facility].  stopped aricept fall 2018    Chronic back pain     lumbar. Dr. Gonzalez Held in [facility] 12/2018    Dehydration     Dysphagia     needs foods cut up    Expressive aphasia     History of essential hypertension     History of subarachnoid hemorrhage 11/24/2019    Lumbar stenosis 2011    moderate to severe w [place] DJD MRI 12/4/18.  surgery 2011    Normal cardiac stress test 07/2017    Pituitary macroadenoma with extrasellar extension ([address]     stable 2019 to 6/2021    PVCs (premature ventricular contractions)     Retinal disease     Dr. Mina Loco Subdural hematoma ([address] 2012, 11/24/19    Upper GI bleed 11/12/2018    presumed ulcer.  no endscopy due to risk    Urinary incontinence        Past Surgical History:   Procedure Laterality Date    HX COLONOSCOPY  01/2010    normal    HX COLONOSCOPY  09/08/2006    polyp    HX HEENT      vitrectomy    HX LUMBAR LAMINECTOMY  2011    HX ORTHOPAEDIC      hammertoe repair    HX TUBAL LIGATION      HX UROLOGICAL      bladder suspension         Family History:   Problem Relation Age of Onset    Lung Disease Mother     Hypertension Mother     Lung Disease Father     OSTEOARTHRITIS Sister     No Known Problems Brother     No Known Problems Daughter     No Known Problems Daughter        Social History     Socioeconomic History  Marital status:      Spouse name: Not on file    Number of children: Not on file    Years of education: Not on file    Highest education level: Not on file   Occupational History    Not on file   Tobacco Use    Smoking status: Former Smoker    Smokeless tobacco: Never Used   Vaping Use    Vaping Use: Never used   Substance and Sexual Activity    Alcohol use: Yes     Comment: occaisional    Drug use: No    Sexual activity: Not on file   Other Topics Concern    Not on file   Social History Narrative    Not on file     Social Determinants of Health     Financial Resource Strain:     Difficulty of Paying Living Expenses: Not on file   Food Insecurity:     Worried About [address] in the Last Year: Not on file    [address] in the Last Year: Not on file   Transportation Needs:     Lack of Transportation (Medical): Not on file    Lack of Transportation (Non-Medical):  Not on file   Physical Activity:     Days of Exercise per Week: Not on file    Minutes of Exercise per Session: Not on file   Stress:     Feeling of Stress : Not on file   Social Connections:     Frequency of Communication with Friends and Family: Not on file    Frequency of Social Gatherings with Friends and Family: Not on file    Attends Taoism Services: Not on file    Active Member of [address] [place] [address] or Organizations: Not on file    Attends Club or Organization Meetings: Not on file    Marital Status: Not on file   Intimate Partner Violence:     Fear of Current or Ex-Partner: Not on file    Emotionally Abused: Not on file    Physically Abused: Not on file    Sexually Abused: Not on file   Housing Stability:     Unable to Pay for Housing in the Last Year: Not on file    Number of [place] in the Last Year: Not on file    Unstable Housing in the Last Year: Not on file         ALLERGIES: Oxycodone    Review of Systems   Unable to perform ROS: Dementia       Vitals:    03/06/22 1329   BP: (!) 155/70 Pulse: 95   Resp: 20   Temp: 97.1 °F (36.2 °C)   SpO2: 98%   Weight: 53.1 kg (117 lb)   Height: 5' 4\" (1.626 m)            Physical Exam  Vitals and nursing note reviewed. Constitutional:       General: She is not in acute distress. Appearance: She is well-developed. HENT:      Head: Normocephalic. Comments: 3 cm laceration to the back of her scalp with no active bleeding. Eyes:      General: No scleral icterus. Conjunctiva/sclera: Conjunctivae normal.      Pupils: Pupils are equal, round, and reactive to light. Cardiovascular:      Rate and Rhythm: Normal rate. Heart sounds: No murmur heard. Pulmonary:      Effort: Pulmonary effort is normal. No respiratory distress. Abdominal:      General: There is no distension. Musculoskeletal:         General: Normal range of motion. Cervical back: Normal range of motion and neck supple. Skin:     General: Skin is warm and dry. Findings: No rash. Neurological:      Mental Status: She is alert and oriented to person, place, and time. MDM  Number of Diagnoses or Management Options     Amount and/or Complexity of Data Reviewed  Tests in the radiology section of CPT®: reviewed           Procedures    2:16 PM  Procedure Note - LACERATION Staple:    Wound Location: back of scalp  Wound Size: 3cm  Debridement: No  Nerve Involvement: No  Tendon Involvement: No  Foreign Bodies Found:  None    Wound edges anesthetized with 5cc of lidocaine 1% with epi. Wound irrigated using 500cc of saline under high pressure. Wound explored for foreign bodies and none found. Wound edges reapproximated and closed using staples. Technique: Simple Interrupted  Number of sutures: 4    Procedure was well tolerated with no complications. Clean dressing placed.   Pt advised on strategies to minimize scarring including but not limited to the use of antibiotic ointment for next 3 days to minimize scabbing and avoidance of direct sunlight for 6 months.

## 2022-03-08 ENCOUNTER — HOSPITAL ENCOUNTER (OUTPATIENT)
Dept: GENERAL RADIOLOGY | Age: 82
Discharge: HOME | End: 2022-03-08
Attending: INTERNAL MEDICINE
Payer: MEDICARE

## 2022-03-08 DIAGNOSIS — G30.9 ALZHEIMER DISEASE (HCC): ICD-10-CM

## 2022-03-08 DIAGNOSIS — F02.80 ALZHEIMER DISEASE (HCC): ICD-10-CM

## 2022-03-08 DIAGNOSIS — R13.10 DYSPHAGIA, UNSPECIFIED TYPE: ICD-10-CM

## 2022-03-08 PROCEDURE — 74230 X-RAY XM SWLNG FUNCJ C+: CPT

## 2022-03-08 PROCEDURE — 92611 MOTION FLUOROSCOPY/SWALLOW: CPT

## 2022-03-08 NOTE — ROUTINE PROCESS
[address], [address] STUDY  Patient: Juan Coronel (4 y.o. female)  Date: 3/8/2022  Referring Provider:  Allyson Vargas:   Patient's  and caregiver report increased dysphagia in the last 6 months with coughing at meals. She is an impulsive eater and drinker and still self feeds. She has not had PNA. Severe dementia noted.  has been chopping her food. OBJECTIVE:   Past Medical History:   Past Medical History:   Diagnosis Date    Alzheimer's dementia ([address]     moderate. clinical trial 6/2017-6/2017 [facility].  stopped aricept fall 2018    Chronic back pain     lumbar. Dr. Xenia Rushing in [facility] 12/2018    Dehydration     Dysphagia     needs foods cut up    Expressive aphasia     History of essential hypertension     History of subarachnoid hemorrhage 11/24/2019    Lumbar stenosis 2011    moderate to severe w [place] DJD MRI 12/4/18.  surgery 2011    Normal cardiac stress test 07/2017    Pituitary macroadenoma with extrasellar extension ([address]     stable 2019 to 6/2021    PVCs (premature ventricular contractions)     Retinal disease     Dr. Jamar Collins Subdural hematoma ([address] 2012, 11/24/19    Upper GI bleed 11/12/2018    presumed ulcer. no endscopy due to risk    Urinary incontinence      Past Surgical History:   Procedure Laterality Date    HX COLONOSCOPY  01/2010    normal    HX COLONOSCOPY  09/08/2006    polyp    HX HEENT      vitrectomy    HX LUMBAR LAMINECTOMY  2011    HX ORTHOPAEDIC      hammertoe repair    HX TUBAL LIGATION      HX UROLOGICAL      bladder suspension     Current Dietary Status:  CHOPPED, THINS  Radiologist: KALANI Views: Lateral  Patient Position: upright in [place] chair    Trial 1: Trial 2:   Consistency Presented: Thin liquid;Puree; Solid;Pill/Tablet     How Presented: SLP-fed/presented;Straw;Spoon (patient helped hold cup. she feeds self at home)      ORAL PHASE:      Bolus Acceptance: Impaired (takes large and multiple sips and bites. SHe is an impulsive eater at home, per family)     Bolus Formation/Control: Impaired (tends to hold food and drink at times. cues to clear): Piecemeal  :     Propulsion: Delayed (# of seconds) (at times, due to cognitive issues)     Oral Residue: None   PHARYNGEAL PHASE:      Initiation of Swallow: Triggered at vallecula     Timing: Pooling 1-5 sec     Penetration: Trace; Before swallow (patient with intermittent penetration with thins due to delay in swallow, especially when she took a drink after eating or with food in the mouth)     Aspiration/Timing: Silent ;Trace (occasionally with thins. Family reports coughing at home. none noted in this session)           Attempted Modifications:  (letting her clear mouth before drinking)     Effective Modifications:  (letting her clear mouth of food before drinking)                   Trial 3: Trial 4:                            :    :                                                                        Decreased Tongue Base Retraction?: No  Laryngeal Elevation: WFL (within functional limits)  Aspiration/Penetration Score: 8 (Aspiration-Contrast passes cords/glottis with no effort to eject, ie/silent aspiration)  Pharyngeal Symmetry: Symmetrical  Pharyngeal-Esophageal Segment:  (noted some peristaltic issues in esophagus, but eventually cleared)               ASSESSMENT :  Based on the objective data described above, the patient presents with mild-moderate oral dysphagia with impulsive eating and drinking, some oral holding and cognitive issues that do not allow her to regulate self. Pharyngeal issues include mild delay in swallow with intermittent penetration with thins and occasional aspiration.          PLAN/RECOMMENDATIONS :  Discussed compensatory strategies and diet with family:   1) continued chopped or minced foods with moisture  2) offer only a small amount of food on the plate at one time (2-3 bites max)  3)  Continue thin liquids. -start the meal with thins to moisten oral-pharyngeal cavity  -avoid drinking with food in the mouth. Make sure her mouth is empty before giving a drink or wait until meal complete.   -control sip size by offering a small amount in cup at one time. 4) if her dysphagia with thins persist, consider starting nectars at meal only and use [employer] protocol. At that  time, she may benefit from [address]. COMMUNICATION/EDUCATION:   The above findings and recommendations were discussed with: patient's  and caregiver, Donovan Showers who verbalized understanding.     Thank you for this referral.  Elza Hou, SLP  Time Calculation: 25 mins

## 2022-03-14 ENCOUNTER — OFFICE VISIT (OUTPATIENT)
Dept: INTERNAL MEDICINE CLINIC | Age: 82
End: 2022-03-14
Payer: MEDICARE

## 2022-03-14 VITALS
BODY MASS INDEX: 19.81 KG/M2 | DIASTOLIC BLOOD PRESSURE: 56 MMHG | HEIGHT: 64 IN | RESPIRATION RATE: 14 BRPM | WEIGHT: 116 LBS | SYSTOLIC BLOOD PRESSURE: 142 MMHG

## 2022-03-14 DIAGNOSIS — G30.8 ALZHEIMER'S DEMENTIA OF OTHER ONSET WITH BEHAVIORAL DISTURBANCE: ICD-10-CM
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
## 2022-03-14 DIAGNOSIS — F02.818 ALZHEIMER'S DEMENTIA OF OTHER ONSET WITH BEHAVIORAL DISTURBANCE: ICD-10-CM
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
## 2022-03-14 DIAGNOSIS — S01.01XA LACERATION OF SCALP, INITIAL ENCOUNTER: Primary | ICD-10-CM

## 2022-03-14 PROCEDURE — G8427 DOCREV CUR MEDS BY ELIG CLIN: HCPCS | Performed by: NURSE PRACTITIONER

## 2022-03-14 PROCEDURE — 1101F PT FALLS ASSESS-DOCD LE1/YR: CPT | Performed by: NURSE PRACTITIONER

## 2022-03-14 PROCEDURE — G8536 NO DOC ELDER MAL SCRN: HCPCS | Performed by: NURSE PRACTITIONER

## 2022-03-14 PROCEDURE — G8399 PT W/DXA RESULTS DOCUMENT: HCPCS | Performed by: NURSE PRACTITIONER

## 2022-03-14 PROCEDURE — 1090F PRES/ABSN URINE INCON ASSESS: CPT | Performed by: NURSE PRACTITIONER

## 2022-03-14 PROCEDURE — G8432 DEP SCR NOT DOC, RNG: HCPCS | Performed by: NURSE PRACTITIONER

## 2022-03-14 PROCEDURE — 99213 OFFICE O/P EST LOW 20 MIN: CPT | Performed by: NURSE PRACTITIONER

## 2022-03-14 PROCEDURE — G8420 CALC BMI NORM PARAMETERS: HCPCS | Performed by: NURSE PRACTITIONER

## 2022-03-14 NOTE — PROGRESS NOTES
Evens Ryan (: 1940) is a 80 y.o. female, established patient, here for evaluation of the following chief complaint(s):  Staple Removal (head)       ASSESSMENT/PLAN:  Below is the assessment and plan developed based on review of pertinent history, physical exam, labs, studies, and medications. 1. Laceration of scalp, initial encounter -- 4 staples removed from occipital scalp laceration; edges of wound appear well approximated and clean. -     REMOVAL OF SUTURES    2. Fall in home, initial encounter    3. Alzheimer's dementia of other onset with behavioral disturbance [facility])      SUBJECTIVE/OBJECTIVE:  HPI    Patient of Dr Flores Lang who presents to office accompanied by her  and caregiver to have staples removed from posterior scalp laceration.  reports she fell against edge of bathtub on 3/6 and he took her to [facility] ED for evaluation; head CT scan was negative for any intracranial bleed, infarct or mass. Wound was cleansed and 4 surgical staples were placed. Tetanus up to date.     Patient Active Problem List   Diagnosis Code    Alzheimer's dementia ([facility] [address] G30.9, F02.80    PVCs (premature ventricular contractions) I49.3    Ceruminosis, right H61.21    Chronic bilateral low back pain M54.50, G89.29    Advanced care planning/counseling discussion Z71.89    History of subarachnoid hemorrhage Z86.79    Subdural hematoma ([facility] [address] S06.5X9A    History of essential hypertension Z86.79    Orthostatic hypotension I95.1    Urinary incontinence R32     Past Surgical History:   Procedure Laterality Date    HX COLONOSCOPY  2010    normal    HX COLONOSCOPY  2006    polyp    HX HEENT      vitrectomy    HX LUMBAR LAMINECTOMY      HX ORTHOPAEDIC      hammertoe repair    HX TUBAL LIGATION      HX UROLOGICAL      bladder suspension     Social History     Socioeconomic History    Marital status:      Spouse name: Not on file    Number of children: Not on file    Years of education: Not on file    Highest education level: Not on file   Occupational History    Not on file   Tobacco Use    Smoking status: Former Smoker    Smokeless tobacco: Never Used   Vaping Use    Vaping Use: Never used   Substance and Sexual Activity    Alcohol use: Yes     Comment: occaisional    Drug use: No    Sexual activity: Not on file   Other Topics Concern    Not on file   Social History Narrative    Not on file     Social Determinants of Health     Financial Resource Strain:     Difficulty of Paying Living Expenses: Not on file   Food Insecurity:     Worried About Running Out of Food in the Last Year: Not on file    Cori of Food in the Last Year: Not on file   Transportation Needs:     Lack of Transportation (Medical): Not on file    Lack of Transportation (Non-Medical):  Not on file   Physical Activity:     Days of Exercise per Week: Not on file    Minutes of Exercise per Session: Not on file   Stress:     Feeling of Stress : Not on file   Social Connections:     Frequency of Communication with Friends and Family: Not on file    Frequency of Social Gatherings with Friends and Family: Not on file    Attends [employer] Services: Not on file    Active Member of Clubs or Organizations: Not on file    Attends Club or Organization Meetings: Not on file    Marital Status: Not on file   Intimate Partner Violence:     Fear of Current or Ex-Partner: Not on file    Emotionally Abused: Not on file    Physically Abused: Not on file    Sexually Abused: Not on file   Housing Stability:     Unable to Pay for Housing in the Last Year: Not on file    Number of [place] in the Last Year: Not on file    Unstable Housing in the Last Year: Not on file     Family History   Problem Relation Age of Onset    Lung Disease Mother     Hypertension Mother     Lung Disease Father     OSTEOARTHRITIS Sister     No Known Problems Brother     No Known Problems Daughter     No Known Problems Daughter      Current Outpatient Medications   Medication Sig    wheat dextrin (Benefiber Sugar Free, dextrin,) 3 gram/4 gram powd Take 3 Scoops by mouth daily. Indications: constipation    cyanocobalamin, vitamin B-12, 2,500 mcg tab tablet Take 1 Tablet by mouth daily. Indications: prevention of vitamin B12 deficiency    cholecalciferol, vitamin D3, (Vitamin D3) 50 mcg (2,000 unit) tab Take 1 Tablet by mouth daily.  traZODone (DESYREL) 50 mg tablet Take 1 Tablet by mouth nightly as needed for Sleep.  polyethylene glycol (MIRALAX) 17 gram/dose powder Take 17 g by mouth daily. No current facility-administered medications for this visit. Allergies   Allergen Reactions    Oxycodone Other (comments)     psychosis       Immunization History   Administered Date(s) Administered    COVID-19, Pfizer Purple top, DILUTE for use, 12+ yrs, 30mcg/0.3mL dose 02/04/2021, 02/25/2021, 09/22/2021    Influenza High Dose Vaccine PF 12/17/2018, 10/31/2020, 09/22/2021    Influenza Vaccine (Tri) Adjuvanted (>65 Yrs FLUAD TRI 92578) 01/08/2020    Pneumococcal Conjugate (PCV-13) 06/18/2015    Pneumococcal Polysaccharide (PPSV-23) 11/08/2007    TB Skin Test (PPD) Intradermal 11/13/2017    Tdap 08/30/2006, 11/10/2015, 11/24/2019    Zoster Vaccine, Live 01/01/2009       Review of Systems   Unable to perform ROS: Dementia       Physical Exam  Vitals and nursing note reviewed. Constitutional:       Appearance: Normal appearance. HENT:      Head: Normocephalic. Laceration present. Comments: 3 cm laceration to posterior scalp; edges well approximated and wound clean  Musculoskeletal:      Cervical back: Normal range of motion and neck supple. Skin:     General: Skin is warm and dry. Neurological:      Mental Status: She is alert. An electronic signature was used to authenticate this note.   -- Rosemarie Lau NP

## 2022-03-18 PROBLEM — I95.1 ORTHOSTATIC HYPOTENSION: Status: ACTIVE | Noted: 2020-03-05

## 2022-03-18 PROBLEM — Z71.89 ADVANCED CARE PLANNING/COUNSELING DISCUSSION: Status: ACTIVE | Noted: 2018-12-18

## 2022-03-19 PROBLEM — H61.21 CERUMINOSIS, RIGHT: Status: ACTIVE | Noted: 2018-09-07

## 2022-03-19 PROBLEM — Z86.79 HISTORY OF SUBARACHNOID HEMORRHAGE: Status: ACTIVE | Noted: 2019-11-24

## 2022-03-19 PROBLEM — G89.29 CHRONIC BILATERAL LOW BACK PAIN: Status: ACTIVE | Noted: 2018-09-07

## 2022-03-19 PROBLEM — M54.50 CHRONIC BILATERAL LOW BACK PAIN: Status: ACTIVE | Noted: 2018-09-07

## 2022-03-25 ENCOUNTER — OFFICE VISIT (OUTPATIENT)
Dept: INTERNAL MEDICINE CLINIC | Age: 82
End: 2022-03-25
Payer: MEDICARE

## 2022-03-25 VITALS
HEART RATE: 60 BPM | SYSTOLIC BLOOD PRESSURE: 137 MMHG | BODY MASS INDEX: 19.81 KG/M2 | DIASTOLIC BLOOD PRESSURE: 83 MMHG | HEIGHT: 64 IN | RESPIRATION RATE: 14 BRPM | WEIGHT: 116 LBS

## 2022-03-25 DIAGNOSIS — F99 INSOMNIA DUE TO OTHER MENTAL DISORDER: ICD-10-CM

## 2022-03-25 DIAGNOSIS — R55 SYNCOPE, UNSPECIFIED SYNCOPE TYPE: Primary | ICD-10-CM

## 2022-03-25 DIAGNOSIS — F51.05 INSOMNIA DUE TO OTHER MENTAL DISORDER: ICD-10-CM

## 2022-03-25 DIAGNOSIS — I95.1 ORTHOSTATIC HYPOTENSION: ICD-10-CM

## 2022-03-25 DIAGNOSIS — G30.9 ALZHEIMER'S DEMENTIA WITH BEHAVIORAL DISTURBANCE, UNSPECIFIED TIMING OF DEMENTIA ONSET: ICD-10-CM

## 2022-03-25 DIAGNOSIS — Z86.79 HISTORY OF ESSENTIAL HYPERTENSION: ICD-10-CM

## 2022-03-25 DIAGNOSIS — R13.11 DYSPHAGIA, ORAL PHASE: ICD-10-CM

## 2022-03-25 DIAGNOSIS — F02.81 ALZHEIMER'S DEMENTIA WITH BEHAVIORAL DISTURBANCE, UNSPECIFIED TIMING OF DEMENTIA ONSET: ICD-10-CM

## 2022-03-25 PROCEDURE — G8420 CALC BMI NORM PARAMETERS: HCPCS | Performed by: INTERNAL MEDICINE

## 2022-03-25 PROCEDURE — G8536 NO DOC ELDER MAL SCRN: HCPCS | Performed by: INTERNAL MEDICINE

## 2022-03-25 PROCEDURE — G8510 SCR DEP NEG, NO PLAN REQD: HCPCS | Performed by: INTERNAL MEDICINE

## 2022-03-25 PROCEDURE — 1090F PRES/ABSN URINE INCON ASSESS: CPT | Performed by: INTERNAL MEDICINE

## 2022-03-25 PROCEDURE — 1101F PT FALLS ASSESS-DOCD LE1/YR: CPT | Performed by: INTERNAL MEDICINE

## 2022-03-25 PROCEDURE — G8399 PT W/DXA RESULTS DOCUMENT: HCPCS | Performed by: INTERNAL MEDICINE

## 2022-03-25 PROCEDURE — G8427 DOCREV CUR MEDS BY ELIG CLIN: HCPCS | Performed by: INTERNAL MEDICINE

## 2022-03-25 PROCEDURE — 99214 OFFICE O/P EST MOD 30 MIN: CPT | Performed by: INTERNAL MEDICINE

## 2022-03-25 RX ORDER — TRAZODONE HYDROCHLORIDE 50 MG/1
50 TABLET ORAL
Qty: 30 TABLET | Refills: 3 | Status: SHIPPED | OUTPATIENT
Start: 2022-03-25

## 2022-03-25 RX ORDER — MIDODRINE HYDROCHLORIDE 2.5 MG/1
2.5 TABLET ORAL 2 TIMES DAILY
Qty: 60 TABLET | Refills: 2 | Status: SHIPPED | OUTPATIENT
Start: 2022-03-25 | End: 2022-07-24

## 2022-03-25 NOTE — PROGRESS NOTES
HISTORY OF PRESENT ILLNESS        Presents for follow-up  She has severe dementia and is accompanied today by her  and her care aide Kalpesh Hussein. She is holding a stuffed animal and makes good eye contact and smiles, mumbling nonsensically, which is all her baseline. She was seen in the emergency room on March 6 for a ground-level fall resulting in injury to her head. She was getting dressed in the bathroom and she fell backwards, striking the back of her head on the edge of the tile. 4 sutures placed in occipital region. .  She return to clinic on March 14 for removal of sutures which she tolerated well. He continues to have issues with severe orthostatic hypotension and dizziness. Was previously taking midodrine for this condition but is not taking it now. She is not on any other antihypertensive medications. She takes trazodone sparingly for sleep but has not taken it recently. Family and caregivers are trying to push fluids. Barium swallow was performed on March 8. She tolerated well and I do appreciate the input from speech pathology. Diagnosed with mild to moderate oral dysphagia with impulsive eating and drinking and some oral holding. Mild delay in swallow with intermittent penetration with thin's and occasional aspiration noted. 1) continued chopped or minced foods with moisture  2) offer only a small amount of food on the plate at one time (2-3 bites max)  3)  Continue thin liquids. -start the meal with thins to moisten oral-pharyngeal cavity  -avoid drinking with food in the mouth. Make sure her mouth is empty before giving a drink or wait until meal complete.   -control sip size by offering a small amount in cup at one time. 4) if her dysphagia with thins persist, consider starting nectars at meal only and use [employer] protocol. At that  time, she may benefit from [address]     Review of Systems   All other systems reviewed and are negative, except as noted in HPI    Past Medical and Surgical History   has a past medical history of Alzheimer's dementia ([address] Chronic back pain, Dehydration, Dysphagia, Expressive aphasia, History of essential hypertension, History of subarachnoid hemorrhage (11/24/2019), Lumbar stenosis (2011), Normal cardiac stress test (07/2017), Orthostatic hypotension (3/5/2020), Pituitary macroadenoma with extrasellar extension ([address] PVCs (premature ventricular contractions), Retinal disease, Subdural hematoma ([address] (2012, 11/24/19), Upper GI bleed (11/12/2018), and Urinary incontinence. has a past surgical history that includes hx lumbar laminectomy (2011); hx orthopaedic; hx heent; hx tubal ligation; hx urological; hx colonoscopy (01/2010); and hx colonoscopy (09/08/2006). reports that she has quit smoking. She has never used smokeless tobacco. She reports current alcohol use. She reports that she does not use drugs. family history includes Hypertension in her mother; Lung Disease in her father and mother; No Known Problems in her brother, daughter, and daughter; OSTEOARTHRITIS in her sister. Physical Exam   Nursing note and vitals reviewed. Blood pressure 137/83, pulse 60, resp. rate 14, height 5' 4\" (1.626 m), weight 116 lb (52.6 kg). Constitutional:  No distress. Eyes: Conjunctivae are normal.   Ears:  Hearing grossly intact  Cardiovascular: Normal rate. regular rhythm, no murmurs or gallops  No edema  Pulmonary/Chest: Effort normal.   CTAB  Musculoskeletal: moves all 4 extremities   Neurological: Alert and makes eye contact when spoken to. Mumbles, cannot speak many clear words. Skin: No visible rash noted. Psychiatric good eye contact, calm    Assessment and Plan    Diagnoses and all orders for this visit:    1. Syncope, unspecified syncope type  Recurrent syncope secondary to orthostatic hypotension. Associated with dementia. Recommend retrying midodrine.   She may have some risk of increasing blood pressure but I think that would be worth the risk to decrease her falls. -     midodrine (PROAMATINE) 2.5 mg tablet; Take 1 Tablet by mouth two (2) times a day for 30 days. 3. Orthostatic hypotension  -     midodrine (PROAMATINE) 2.5 mg tablet; Take 1 Tablet by mouth two (2) times a day for 30 days. -     CBC W/O DIFF; Future  -     METABOLIC PANEL, COMPREHENSIVE; Future    2. Insomnia due to other mental disorder  Requires monitoring in May. Using trazodone only sparingly. -     traZODone (DESYREL) 50 mg tablet; Take 1 Tablet by mouth nightly as needed for Sleep. 4. Alzheimer's dementia with behavioral disturbance, unspecified timing of dementia onset ([facility] [address]  Elected not to continue any medical therapy since 2018. Remains relatively stable and I do not think there was will be any improvement with medical therapy. Aphasia. Care at home with  and caregiver Enrique Schwab    5. Dysphagia, oral phase  Recommendations as listed above. 6. History of essential hypertension        lab results and schedule of future lab studies reviewed with patient  reviewed medications and side effects in detail    Return to clinic for further evaluation if new symptoms develop        Current Outpatient Medications   Medication Sig    wheat dextrin (Benefiber Sugar Free, dextrin,) 3 gram/4 gram powd Take 3 Scoops by mouth daily. Indications: constipation    cyanocobalamin, vitamin B-12, 2,500 mcg tab tablet Take 1 Tablet by mouth daily. Indications: prevention of vitamin B12 deficiency    cholecalciferol, vitamin D3, (Vitamin D3) 50 mcg (2,000 unit) tab Take 1 Tablet by mouth daily.  traZODone (DESYREL) 50 mg tablet Take 1 Tablet by mouth nightly as needed for Sleep.  polyethylene glycol (MIRALAX) 17 gram/dose powder Take 17 g by mouth daily. No current facility-administered medications for this visit.

## 2022-03-26 LAB
ALBUMIN SERPL-MCNC: 3.5 G/DL (ref 3.5–5)
ALBUMIN/GLOB SERPL: 1 {RATIO} (ref 1.1–2.2)
ALP SERPL-CCNC: 92 U/L (ref 45–117)
ALT SERPL-CCNC: 21 U/L (ref 12–78)
ANION GAP SERPL CALC-SCNC: 4 MMOL/L (ref 5–15)
AST SERPL-CCNC: 23 U/L (ref 15–37)
BILIRUB SERPL-MCNC: 0.4 MG/DL (ref 0.2–1)
BUN SERPL-MCNC: 20 MG/DL (ref 6–20)
BUN/CREAT SERPL: 29 (ref 12–20)
CALCIUM SERPL-MCNC: 8.9 MG/DL (ref 8.5–10.1)
CHLORIDE SERPL-SCNC: 106 MMOL/L (ref 97–108)
CO2 SERPL-SCNC: 26 MMOL/L (ref 21–32)
CREAT SERPL-MCNC: 0.69 MG/DL (ref 0.55–1.02)
ERYTHROCYTE [DISTWIDTH] IN BLOOD BY AUTOMATED COUNT: 13.7 % (ref 11.5–14.5)
GLOBULIN SER CALC-MCNC: 3.4 G/DL (ref 2–4)
GLUCOSE SERPL-MCNC: 85 MG/DL (ref 65–100)
HCT VFR BLD AUTO: 43.1 % (ref 35–47)
HGB BLD-MCNC: 13.4 G/DL (ref 11.5–16)
MCH RBC QN AUTO: 28.6 PG (ref 26–34)
MCHC RBC AUTO-ENTMCNC: 31.1 G/DL (ref 30–36.5)
MCV RBC AUTO: 91.9 FL (ref 80–99)
NRBC # BLD: 0 K/UL (ref 0–0.01)
NRBC BLD-RTO: 0 PER 100 WBC
PLATELET # BLD AUTO: 214 K/UL (ref 150–400)
PMV BLD AUTO: 11.7 FL (ref 8.9–12.9)
POTASSIUM SERPL-SCNC: 4.5 MMOL/L (ref 3.5–5.1)
PROT SERPL-MCNC: 6.9 G/DL (ref 6.4–8.2)
RBC # BLD AUTO: 4.69 M/UL (ref 3.8–5.2)
SODIUM SERPL-SCNC: 136 MMOL/L (ref 136–145)
WBC # BLD AUTO: 8.2 K/UL (ref 3.6–11)

## 2022-08-24 ENCOUNTER — PATIENT MESSAGE (OUTPATIENT)
Dept: INTERNAL MEDICINE CLINIC | Age: 82
End: 2022-08-24

## 2022-08-25 ENCOUNTER — OFFICE VISIT (OUTPATIENT)
Dept: INTERNAL MEDICINE CLINIC | Age: 82
End: 2022-08-25
Payer: MEDICARE

## 2022-08-25 VITALS
BODY MASS INDEX: 19.63 KG/M2 | DIASTOLIC BLOOD PRESSURE: 61 MMHG | SYSTOLIC BLOOD PRESSURE: 120 MMHG | RESPIRATION RATE: 15 BRPM | HEART RATE: 60 BPM | HEIGHT: 64 IN | WEIGHT: 115 LBS

## 2022-08-25 DIAGNOSIS — K64.9 BLEEDING HEMORRHOIDS: Primary | ICD-10-CM

## 2022-08-25 PROCEDURE — G8399 PT W/DXA RESULTS DOCUMENT: HCPCS | Performed by: INTERNAL MEDICINE

## 2022-08-25 PROCEDURE — G8536 NO DOC ELDER MAL SCRN: HCPCS | Performed by: INTERNAL MEDICINE

## 2022-08-25 PROCEDURE — 1101F PT FALLS ASSESS-DOCD LE1/YR: CPT | Performed by: INTERNAL MEDICINE

## 2022-08-25 PROCEDURE — G8510 SCR DEP NEG, NO PLAN REQD: HCPCS | Performed by: INTERNAL MEDICINE

## 2022-08-25 PROCEDURE — 1090F PRES/ABSN URINE INCON ASSESS: CPT | Performed by: INTERNAL MEDICINE

## 2022-08-25 PROCEDURE — 99213 OFFICE O/P EST LOW 20 MIN: CPT | Performed by: INTERNAL MEDICINE

## 2022-08-25 PROCEDURE — G8427 DOCREV CUR MEDS BY ELIG CLIN: HCPCS | Performed by: INTERNAL MEDICINE

## 2022-08-25 PROCEDURE — G8420 CALC BMI NORM PARAMETERS: HCPCS | Performed by: INTERNAL MEDICINE

## 2022-08-25 NOTE — TELEPHONE ENCOUNTER
----- Message from Shankar Spears sent at 8/25/2022  8:55 AM EDT -----  Subject: Message to Provider    QUESTIONS  Information for Provider? Spoke with patient's  Dayton Scales, he states   that he was told to speak with [facility] this morning however not able to reach   office due to error with the phones.  Please return his call, thank you  ---------------------------------------------------------------------------  --------------  [address]  [phone]; OK to leave message on voicemail  ---------------------------------------------------------------------------  --------------  SCRIPT ANSWERS  undefined

## 2022-08-25 NOTE — TELEPHONE ENCOUNTER
----- Message from Jagdish Bonner sent at 8/25/2022  8:55 AM EDT -----  Subject: Message to Provider    QUESTIONS  Information for Provider? Spoke with patient's  Chuy Dodge, he states   that he was told to speak with [facility] this morning however not able to reach   office due to error with the phones.  Please return his call, thank you  ---------------------------------------------------------------------------  --------------  [address]  [phone]; OK to leave message on voicemail  ---------------------------------------------------------------------------  --------------  SCRIPT ANSWERS  undefined

## 2022-08-25 NOTE — TELEPHONE ENCOUNTER
Spoke with Yoseph/Spouse (HIPPA VERIFIED) and he reports that patient is having hematochezia. He is unclear if it is a hemorrhoid. He reports that patient has Alzheimer's and has been healthy up to this point in time. He is requesting an appt. Scheduled for today at 2:20 PM. Grateful for the call.

## 2022-08-28 NOTE — PROGRESS NOTES
HISTORY OF PRESENT ILLNESS    Chief Complaint   Patient presents with    Anal Bleeding     Bloody stools - 3 days. Presents for follow-up    Presents with her  and with her caregiver, Lucas Lemos. Patient is nonverbal secondary to severe dementia. Presents w a  with a 3-day history of intermittent blood in the stool. Symptoms have currently resolved. Reports red blood streaks on the stool and in the water. Small amount of blood in undergarments. Patient has not demonstrated any obvious pain with numbness. She is having regular bowel movements with ongoing use of Benefiber and MiraLAX as needed. Her last colonoscopy was in 2010. Deferred further colonoscopy secondary to dementia. No history of anemia.  says that she is not acting any more fatigued than usual and does not appear to be dizzy. Review of Systems   All other systems reviewed and are negative, except as noted in HPI    Past Medical and Surgical History   has a past medical history of Alzheimer's dementia ([address] Chronic back pain, Dehydration, Dysphagia, Expressive aphasia, History of essential hypertension, History of subarachnoid hemorrhage (11/24/2019), Lumbar stenosis (2011), Normal cardiac stress test (07/2017), Orthostatic hypotension (3/5/2020), Pituitary macroadenoma with extrasellar extension ([address] PVCs (premature ventricular contractions), Retinal disease, Subdural hematoma ([address] (2012, 11/24/19), Upper GI bleed (11/12/2018), and Urinary incontinence. has a past surgical history that includes hx lumbar laminectomy (2011); hx orthopaedic; hx heent; hx tubal ligation; hx urological; hx colonoscopy (01/2010); and hx colonoscopy (09/08/2006). reports that she has quit smoking. She has never used smokeless tobacco. She reports current alcohol use. She reports that she does not use drugs.   family history includes Hypertension in her mother; Lung Disease in her father and mother; No Known Problems in her brother, daughter, and daughter; [place] in her sister. Physical Exam   Nursing note and vitals reviewed. Blood pressure 120/61, pulse 60, resp. rate 15, height 5' 4\" (1.626 m), weight 115 lb (52.2 kg). Constitutional:  No distress. Eyes: Conjunctivae are normal.  No conjunctival pallor. Ears:  Hearing grossly intact  Cardiovascular: Normal rate. regular rhythm, no murmurs or gallops  No edema  Pulmonary/Chest: Effort normal.   CTAB  Musculoskeletal: moves all 4 extremities   Rectal exam did show a approximately 1 cm hemorrhoid in the rectal vault at around 2 o'clock position. There is evidence of recent bleeding. No other obvious fissures or pathology. Neurological: Alert and oriented to person, place, and time. Skin: No visible rash noted. Psychiatric: Normal mood and affect. Behavior is normal.     Assessment and Plan    Diagnoses and all orders for this visit:    1. Bleeding hemorrhoids  History and exam are consistent with a bleeding hemorrhoid. This does not appear to be a significant bleeding at this time and she does not have any physical stigmata of anemia. Recommend continuing to keep stool soft with fiber supplement and MiraLAX. Avoidance of prolonged straining on the toilet. Recommend wiping gently with witch hazel. No obvious pain which I would think warrants additional therapy. Return to clinic if progressive severe symptoms develop.   -     witch hazel-glycerin (TUCKS) 12.5-50 % pad; 1 Pad by PeriANAL route as needed for Pain.    lab results and schedule of future lab studies reviewed with patient  reviewed medications and side effects in detail    Return to clinic for further evaluation if new symptoms develop      Current Outpatient Medications   Medication Sig    witch hazel-glycerin (TUCKS) 12.5-50 % pad 1 Pad by PeriANAL route as needed for Pain.    midodrine (PROAMATINE) 2.5 mg tablet TAKE 1 TABLET BY MOUTH TWO (2) TIMES A DAY    traZODone (DESYREL) 50 mg tablet Take 1 Tablet by mouth nightly as needed for Sleep.    wheat dextrin (Benefiber Sugar Free, dextrin,) 3 gram/4 gram powd Take 3 Scoops by mouth daily. Indications: constipation    cyanocobalamin, vitamin B-12, 2,500 mcg tab tablet Take 1 Tablet by mouth daily. Indications: prevention of vitamin B12 deficiency    cholecalciferol, vitamin D3, (Vitamin D3) 50 mcg (2,000 unit) tab Take 1 Tablet by mouth daily. polyethylene glycol (MIRALAX) 17 gram/dose powder Take 17 g by mouth daily. No current facility-administered medications for this visit.

## 2022-09-12 ENCOUNTER — TELEPHONE (OUTPATIENT)
Dept: INTERNAL MEDICINE CLINIC | Age: 82
End: 2022-09-12

## 2022-09-12 NOTE — TELEPHONE ENCOUNTER
Spoke with Yoseph/Spouse (HIPPA VERIFIED) and advised that the Shingrix and Covid Booster are safe to be given together. He states understanding and grateful for the call.

## 2022-09-12 NOTE — TELEPHONE ENCOUNTER
Pt is calling wanted to know if it is safe for his wife to receive the shingrix vaccine at the same time/day she gets her covid booster shot.  Please advise

## 2023-01-11 DIAGNOSIS — G89.29 CHRONIC BILATERAL LOW BACK PAIN, UNSPECIFIED WHETHER SCIATICA PRESENT: Primary | ICD-10-CM

## 2023-01-11 DIAGNOSIS — G30.9 SEVERE ALZHEIMER'S DEMENTIA WITH OTHER BEHAVIORAL DISTURBANCE, UNSPECIFIED TIMING OF DEMENTIA ONSET (HCC): ICD-10-CM

## 2023-01-11 DIAGNOSIS — F02.C18 SEVERE ALZHEIMER'S DEMENTIA WITH OTHER BEHAVIORAL DISTURBANCE, UNSPECIFIED TIMING OF DEMENTIA ONSET (HCC): ICD-10-CM

## 2023-01-11 DIAGNOSIS — M54.50 CHRONIC BILATERAL LOW BACK PAIN, UNSPECIFIED WHETHER SCIATICA PRESENT: Primary | ICD-10-CM

## 2023-01-11 DIAGNOSIS — Z74.09 IMPAIRED FUNCTIONAL MOBILITY, BALANCE, GAIT, AND ENDURANCE: ICD-10-CM

## 2023-01-12 ENCOUNTER — DOCUMENTATION ONLY (OUTPATIENT)
Dept: INTERNAL MEDICINE CLINIC | Age: 83
End: 2023-01-12

## 2023-01-12 NOTE — PROGRESS NOTES
Referral to [facility] for recommended PT evaluation. Faxed as requested. The [employer] phone number is [phone], and the fax number is [phone].   Jaime Willett LPN

## 2023-01-13 ENCOUNTER — TELEPHONE (OUTPATIENT)
Dept: INTERNAL MEDICINE CLINIC | Age: 83
End: 2023-01-13

## 2023-01-13 NOTE — TELEPHONE ENCOUNTER
Spoke with Yoseph/Spouse (HIPPA VERIFIED) and updated on Linda's report that they are unable to accept referral for patient due to her insurance with [employer]. Advised him to contact Ольга and nichelle out who their preferred providers are in his area and Dr. Dai Deras will send referral to them. HE states that he will do that and let me know which Home care provider to send referral to rather than risk the same thing happening with another provider. He states understanding and grateful for the call. Dr. Dai Deras notified.

## 2023-01-13 NOTE — TELEPHONE ENCOUNTER
[facility] representative called to state they did get the referral our office sent. They cannot take her due to her [employer]. Representative states they cannot accommodate any additional patients with this insurance at this time.

## 2023-01-16 NOTE — TELEPHONE ENCOUNTER
----- Message from Yasmin Fleming sent at 1/16/2023  2:25 PM EST -----  Subject: Message to Provider    QUESTIONS  Information for Provider? Lou Velez from [facility] called to let the   office know that they will be able to go forward with seeing her.   ---------------------------------------------------------------------------  --------------  [address]  [phone]; OK to leave message on voicemail  ---------------------------------------------------------------------------  --------------  SCRIPT ANSWERS  Relationship to Patient? Third Party  Third Party Type? Home Health Care? Representative Name?  Lou Velez

## 2023-01-22 NOTE — TELEPHONE ENCOUNTER
Patients  called requesting a refill on medication that was written by another doctor when out of [place].  stated that patient would like DR. Easley to take over filling medication if possible.  would like a call from nurse letting him know this is able to be done.        Yoseph Junior [phone]    Memantine - 1 capsule 28 MG a day - 90 day refill     [id number]- [place], [place] - [address] [place] [address]  [phone]
rx sent
sent
You can access the FollowMyHealth Patient Portal offered by [facility] by registering at the following website: [email or web address]. By joining [employer]’s FollowMyHealth portal, you will also be able to view your health information using other applications (apps) compatible with our system.

## 2023-02-28 ENCOUNTER — OFFICE VISIT (OUTPATIENT)
Dept: INTERNAL MEDICINE CLINIC | Age: 83
End: 2023-02-28
Payer: MEDICARE

## 2023-02-28 VITALS
OXYGEN SATURATION: 98 % | HEART RATE: 56 BPM | DIASTOLIC BLOOD PRESSURE: 58 MMHG | HEIGHT: 64 IN | WEIGHT: 116 LBS | SYSTOLIC BLOOD PRESSURE: 121 MMHG | BODY MASS INDEX: 19.81 KG/M2 | TEMPERATURE: 98.1 F | RESPIRATION RATE: 15 BRPM

## 2023-02-28 DIAGNOSIS — F02.C0 SEVERE ALZHEIMER'S DEMENTIA WITHOUT BEHAVIORAL DISTURBANCE, PSYCHOTIC DISTURBANCE, MOOD DISTURBANCE, OR ANXIETY, UNSPECIFIED TIMING OF DEMENTIA ONSET (HCC): ICD-10-CM

## 2023-02-28 DIAGNOSIS — I95.1 ORTHOSTATIC HYPOTENSION: ICD-10-CM

## 2023-02-28 DIAGNOSIS — Z00.00 MEDICARE ANNUAL WELLNESS VISIT, SUBSEQUENT: Primary | ICD-10-CM

## 2023-02-28 DIAGNOSIS — Z66 DNR (DO NOT RESUSCITATE): ICD-10-CM

## 2023-02-28 DIAGNOSIS — G30.9 SEVERE ALZHEIMER'S DEMENTIA WITHOUT BEHAVIORAL DISTURBANCE, PSYCHOTIC DISTURBANCE, MOOD DISTURBANCE, OR ANXIETY, UNSPECIFIED TIMING OF DEMENTIA ONSET (HCC): ICD-10-CM

## 2023-02-28 PROCEDURE — G8428 CUR MEDS NOT DOCUMENT: HCPCS | Performed by: INTERNAL MEDICINE

## 2023-02-28 PROCEDURE — G0439 PPPS, SUBSEQ VISIT: HCPCS | Performed by: INTERNAL MEDICINE

## 2023-02-28 PROCEDURE — 1101F PT FALLS ASSESS-DOCD LE1/YR: CPT | Performed by: INTERNAL MEDICINE

## 2023-02-28 PROCEDURE — G8510 SCR DEP NEG, NO PLAN REQD: HCPCS | Performed by: INTERNAL MEDICINE

## 2023-02-28 PROCEDURE — G8420 CALC BMI NORM PARAMETERS: HCPCS | Performed by: INTERNAL MEDICINE

## 2023-02-28 PROCEDURE — G8399 PT W/DXA RESULTS DOCUMENT: HCPCS | Performed by: INTERNAL MEDICINE

## 2023-02-28 PROCEDURE — G8536 NO DOC ELDER MAL SCRN: HCPCS | Performed by: INTERNAL MEDICINE

## 2023-02-28 RX ORDER — TRIAMCINOLONE ACETONIDE 1 MG/G
CREAM TOPICAL
COMMUNITY
Start: 2023-01-26

## 2023-02-28 RX ORDER — MIDODRINE HYDROCHLORIDE 2.5 MG/1
2.5 TABLET ORAL DAILY
Qty: 90 TABLET | Refills: 1
Start: 2023-02-28

## 2023-02-28 NOTE — PROGRESS NOTES
This is a Subsequent Medicare Annual Wellness Visit providing Personalized Prevention Plan Services (PPPS) (Performed 12 months after initial AWV and PPPS )    I have reviewed the patient's medical history in detail and updated the computerized patient record. She is accompanied by her , Kizzy Moreno, and caregiver Derrek Peterson. She is nonverbal with severe Alzheimer's dementia. She can make eye contact and smile, but does not interact appropriately. She is holding onto a stuffed animal for security. She does fall asleep during the visit at times.  states that she has been eating well. Requires assistance for all ADLs. Requires assistance with ambulation or she will fall. She has orthostatic hypotension but does not significantly get dizzy with standing as long as it is done slowly and she is given support. Continues to take midodrine once daily for the static hypotension. Sleeping well with use of trazodone. Seeing Dr. Byron Mackey in neurology. He is not taking any medications for dementia due to lack of potential efficacy. Wt Readings from Last 3 Encounters:   02/28/23 116 lb (52.6 kg)   08/25/22 115 lb (52.2 kg)   03/25/22 116 lb (52.6 kg)         History     Past Medical History:   Diagnosis Date    Alzheimer's dementia ([address]     moderate. clinical trial 6/2017-6/2017 [facility].  stopped aricept fall 2018    Chronic back pain     lumbar.   Dr. Billy Isabel in [facility] 12/2018    Dehydration     DNR (do not resuscitate)     Dysphagia     needs foods cut up    Expressive aphasia     History of essential hypertension     History of subarachnoid hemorrhage 11/24/2019    Lumbar stenosis 2011    moderate to severe w andrew GARCIA MRI 12/4/18.  surgery 2011    Normal cardiac stress test 07/2017    Orthostatic hypotension 03/05/2020    Pituitary macroadenoma with extrasellar extension ([address]     stable 2019 to 6/2021    PVCs (premature ventricular contractions)     Retinal disease     Dr. Arabella Rawls    Subdural hematoma 2012, 11/24/19    Upper GI bleed 11/12/2018    presumed ulcer. no endscopy due to risk    Urinary incontinence        Past Surgical History:   Procedure Laterality Date    HX COLONOSCOPY  01/2010    normal    HX COLONOSCOPY  09/08/2006    polyp    HX HEENT      vitrectomy    HX LUMBAR LAMINECTOMY  2011    HX ORTHOPAEDIC      hammertoe repair    HX TUBAL LIGATION      HX UROLOGICAL      bladder suspension       Current Outpatient Medications   Medication Sig    triamcinolone acetonide (KENALOG) 0.1 % topical cream APPLY 1 APPLICATION TO BACK EXTERNALLY ONCE DAILY AS NEEDED 30 DAYS    midodrine (PROAMATINE) 2.5 mg tablet Take 1 Tablet by mouth daily. witch hazel-glycerin (TUCKS) 12.5-50 % pad 1 Pad by PeriANAL route as needed for Pain. traZODone (DESYREL) 50 mg tablet Take 1 Tablet by mouth nightly as needed for Sleep.    wheat dextrin (Benefiber Sugar Free, dextrin,) 3 gram/4 gram powd Take 3 Scoops by mouth daily. Indications: constipation    cyanocobalamin, vitamin B-12, 2,500 mcg tab tablet Take 1 Tablet by mouth daily. Indications: prevention of vitamin B12 deficiency    cholecalciferol, vitamin D3, (Vitamin D3) 50 mcg (2,000 unit) tab Take 1 Tablet by mouth daily. polyethylene glycol (MIRALAX) 17 gram/dose powder Take 17 g by mouth daily. No current facility-administered medications for this visit. Allergies   Allergen Reactions    Oxycodone Other (comments)     psychosis         Family History   Problem Relation Age of Onset    Lung Disease Mother     Hypertension Mother     Lung Disease Father     OSTEOARTHRITIS Sister     No Known Problems Brother     No Known Problems Daughter     No Known Problems Daughter         reports that she has quit smoking. She has never used smokeless tobacco.   reports current alcohol use. Depression Risk Factor Screening:       Alcohol Risk Factor Screening:    On any occasion during the past 3 months, have you had more than 3 drinks containing alcohol? No    Do you average more than 14 drinks per week? No      Functional Ability and Level of Safety:     Hearing Loss   mild    Activities of Daily Living   fatiged  Requires assistance with:  ALL ADLs    Fall Risk     Fall Risk Assessment, last 12 mths 3/14/2022   Able to walk? Yes   Fall in past 12 months? 1   Do you feel unsteady? 1   Are you worried about falling 1   Is the gait abnormal? 1   Number of falls in past 12 months 2   Fall with injury? 1         Abuse Screen   Patient is not abused    Review of Systems   A comprehensive review of systems was negative except for that written in the HPI. Physical Examination     Evaluation of Cognitive Function:  Mood/affect:  neutral, happy  Appearance: age appropriate  Family member/caregiver input: none    Blood pressure (!) 121/58, pulse (!) 56, resp. rate 15, height 5' 4\" (1.626 m), weight 116 lb (52.6 kg), SpO2 98 %. General appearance: alert, cooperative, no distress, appears stated age  Neck: supple, symmetrical, trachea midline, no adenopathy, thyroid: not enlarged, symmetric, no tenderness/mass/nodules, no carotid bruit and no JVD  Lungs: clear to auscultation bilaterally  Heart: regular rate and rhythm, S1, S2 normal, no murmur, click, rub or gallop  Extremities: extremities normal, atraumatic, no cyanosis or edema    Patient Care Team:  Michele Bird MD as PCP - General (Internal Medicine Physician)  Michele Bird MD as PCP - [facility] Empaneled Provider      Advice/Referrals/Counseling   Education and counseling provided. See below for specific orders    Assessment/Plan     Diagnoses and all orders for this visit:    1. Medicare annual wellness visit, subsequent  ACP reviewed. Primary medical decision maker reviewed with patient and updated in chart. she is otherwise up-to-date or have declined preventative services except for those ordered below. 2. DNR (do not resuscitate)  DNR documents are scanned in.   We will try to get them in the correct section of the chart. I reprinted them and put in for re-scanning.  -     DO NOT RESUSCITATE    3. Severe Alzheimer's dementia without behavioral disturbance, psychotic disturbance, mood disturbance, or anxiety, unspecified timing of dementia onset ([address]  Requires complete support and care. Seeing neurology. Has caregiver and supportive . She is eating well and has a very safe environment. Limited quality of life at this point. 4. Orthostatic hypotension  Stable on midodrine. Continue.  -     midodrine (PROAMATINE) 2.5 mg tablet; Take 1 Tablet by mouth daily.  -     METABOLIC PANEL, COMPREHENSIVE; Future  -     CBC W/O DIFF; Future  . Potential medication side effects were discussed with the patient; let me know if any occur.   Return for yearly Annual Wellness Visits

## 2023-04-17 ENCOUNTER — TELEPHONE (OUTPATIENT)
Dept: INTERNAL MEDICINE CLINIC | Age: 83
End: 2023-04-17

## 2023-04-17 NOTE — TELEPHONE ENCOUNTER
From: Suzette Blandon  Sent: 4/17/2023   1:36 PM EDT  To: [facility]  Subject: Lupillo Arenas, swollen and blue left foot        Dr. Bailey Montenegro left foot has swollen, and it has limited her left foot motion when walking with assistance. In addition, both of her feet have a dark red to blue tone. In the recent past, her dermatologist, Dr. Alex Bazan of [facility], examined Kanchan's feet. At the time, as I recall, she expressed the opinion that the visible red veins were thin skin. However, I believe that the condition may have worsened. Therefore, I would like you to examine her feet and, if appropriate, give us a referral to a physician specializing in circulatory system issue. Please inform me as to when you will be able to see Mehdi Callaway to examine her. A photo of her left foot is attached to this note.   Yamilka Bob

## 2023-04-18 NOTE — TELEPHONE ENCOUNTER
Spoke with Yoseph/Spouse (HIPPA VERIFIED) and he reports that he did received Dr. Irma Bennett message and he will see us with patient on 5/1/23 at 3:00 PM.  Grateful for the call.

## 2023-05-01 ENCOUNTER — OFFICE VISIT (OUTPATIENT)
Dept: INTERNAL MEDICINE CLINIC | Age: 83
End: 2023-05-01
Payer: MEDICARE

## 2023-05-01 VITALS
HEART RATE: 57 BPM | HEIGHT: 64 IN | DIASTOLIC BLOOD PRESSURE: 62 MMHG | BODY MASS INDEX: 19.91 KG/M2 | SYSTOLIC BLOOD PRESSURE: 117 MMHG | RESPIRATION RATE: 15 BRPM | OXYGEN SATURATION: 100 %

## 2023-05-01 DIAGNOSIS — R09.89 DECREASED PEDAL PULSES: ICD-10-CM

## 2023-05-01 DIAGNOSIS — I99.8 ISCHEMIA OF FOOT: Primary | ICD-10-CM

## 2023-05-01 DIAGNOSIS — I95.1 ORTHOSTATIC HYPOTENSION: ICD-10-CM

## 2023-05-01 DIAGNOSIS — I73.9 PAD (PERIPHERAL ARTERY DISEASE) (HCC): ICD-10-CM

## 2023-05-01 PROCEDURE — G8427 DOCREV CUR MEDS BY ELIG CLIN: HCPCS | Performed by: INTERNAL MEDICINE

## 2023-05-01 PROCEDURE — G8420 CALC BMI NORM PARAMETERS: HCPCS | Performed by: INTERNAL MEDICINE

## 2023-05-01 PROCEDURE — 1090F PRES/ABSN URINE INCON ASSESS: CPT | Performed by: INTERNAL MEDICINE

## 2023-05-01 PROCEDURE — 99213 OFFICE O/P EST LOW 20 MIN: CPT | Performed by: INTERNAL MEDICINE

## 2023-05-01 PROCEDURE — G8536 NO DOC ELDER MAL SCRN: HCPCS | Performed by: INTERNAL MEDICINE

## 2023-05-01 PROCEDURE — 1101F PT FALLS ASSESS-DOCD LE1/YR: CPT | Performed by: INTERNAL MEDICINE

## 2023-05-01 PROCEDURE — G8510 SCR DEP NEG, NO PLAN REQD: HCPCS | Performed by: INTERNAL MEDICINE

## 2023-05-01 PROCEDURE — G8399 PT W/DXA RESULTS DOCUMENT: HCPCS | Performed by: INTERNAL MEDICINE

## 2023-05-01 NOTE — PROGRESS NOTES
HISTORY OF PRESENT ILLNESS    Chief Complaint   Patient presents with    Foot Swelling     Left - limiting motion when walking with assistance. Bilateral bruising/discoloration - poor circulation - 1 month/acute. Presents for follow-up    She is coming by her , Manuel Deras. Caregiver Aid Charlette Park is not with her today. Patient is nonverbal due to severe dementia. Makes decent eye contact and smiles, nods, but does not understand conversations. Manuel Deras is noticed that both of her feet appear dark red to blue at times. Worse if she has been sitting for a while. She has denied any foot pain, but she is usually not expressing pain at all. No known history of vascular disease. She does have a history of orthostatic hypotension and is taking midodrine 2.5 mg daily for this. Review of Systems   All other systems reviewed and are negative, except as noted in HPI    Past Medical and Surgical History   has a past medical history of Alzheimer's dementia ([address] Chronic back pain, Dehydration, DNR (do not resuscitate), Dysphagia, Expressive aphasia, History of essential hypertension, History of subarachnoid hemorrhage (11/24/2019), Lumbar stenosis (2011), Normal cardiac stress test (07/2017), Orthostatic hypotension (03/05/2020), Pituitary macroadenoma with extrasellar extension ([address] PVCs (premature ventricular contractions), Retinal disease, Subdural hematoma ([address] (2012, 11/24/19), Upper GI bleed (11/12/2018), and Urinary incontinence. has a past surgical history that includes hx lumbar laminectomy (2011); hx orthopaedic; hx heent; hx tubal ligation; hx urological; hx colonoscopy (01/2010); and hx colonoscopy (09/08/2006). reports that she has quit smoking. She has never used smokeless tobacco. She reports current alcohol use. She reports that she does not use drugs.   family history includes Hypertension in her mother; Lung Disease in her father and mother; No Known Problems in her brother, daughter, and daughter; OSTEOARTHRITIS in her sister. Physical Exam   Nursing note and vitals reviewed. Blood pressure 117/62, pulse (!) 57, resp. rate 15, height 5' 4\" (1.626 m), SpO2 100 %. Constitutional:  No distress. Eyes: Conjunctivae are normal.   Ears:  Hearing grossly intact  Cardiovascular: Normal rate. regular rhythm, no murmurs or gallops  No edema  Pulmonary/Chest: Effort normal.   CTAB  Musculoskeletal: moves all 4 extremities   Neurological: Alert and oriented to person, place, and time. Skin: No visible rash noted. Psychiatric: Pleasant, nonverbal  Bilateral feet with distal skin changes. Nonpalpable dorsalis pedis and posterior tibial pulses bilaterally. Trace edema noted. No calf tenderness or swelling. Assessment and Plan    Diagnoses and all orders for this visit:    1. Ischemia of foot  2. Decreased pedal pulses  3. PAD (peripheral artery disease) ([facility])  -     REFERRAL TO VASCULAR SURGERY  Not painful. Clearly has decreased pulses and I think evaluation would be helpful. Aggressive care in this [de-identified]year-old with severe dementia would not be pursued by family, but need to make sure that she is not at risk for any progressive ischemia at this time. Could consider adding clopidogrel or other blood thinner. Midodrine side effects? 4. Orthostatic hypotension  Reasonably well controlled with midodrine.  may try stopping midodrine to see if it helps peripheral vasoconstriction as above.    lab results and schedule of future lab studies reviewed with patient  reviewed medications and side effects in detail    Return to clinic for further evaluation if new symptoms develop        Current Outpatient Medications   Medication Sig    triamcinolone acetonide (KENALOG) 0.1 % topical cream APPLY 1 APPLICATION TO BACK EXTERNALLY ONCE DAILY AS NEEDED 30 DAYS    midodrine (PROAMATINE) 2.5 mg tablet Take 1 Tablet by mouth daily.     witch hazel-glycerin (TUCKS) 12.5-50 % pad 1 Pad by PeriANAL route as needed for Pain. traZODone (DESYREL) 50 mg tablet Take 1 Tablet by mouth nightly as needed for Sleep.    wheat dextrin (Benefiber Sugar Free, dextrin,) 3 gram/4 gram powd Take 3 Scoops by mouth daily. Indications: constipation    cyanocobalamin, vitamin B-12, 2,500 mcg tab tablet Take 1 Tablet by mouth daily. Indications: prevention of vitamin B12 deficiency    cholecalciferol, vitamin D3, (Vitamin D3) 50 mcg (2,000 unit) tab Take 1 Tablet by mouth daily. polyethylene glycol (MIRALAX) 17 gram/dose powder Take 17 g by mouth daily. No current facility-administered medications for this visit.

## 2023-05-10 NOTE — TELEPHONE ENCOUNTER
----- Message from Andrew Zurita sent at 11/29/2019  8:50 AM EST -----  Regarding: Dr. Melquiades Rain Message/Vendor Calls    Caller's first and last name: Mr. Carle Cabot      Reason for call: Mr. Patricia Villarreal, , is requesting to speak with Dr. Fide Weinstein nurse in regards to his wife most recent immunizations in particular the patient's pneumonia shots.       Callback required yes/no and why: Yes       Best contact number(s): [phone]      Details to clarify the request:       Andrew Zurita 20

## 2023-05-21 RX ORDER — WHEAT DEXTRIN 3 G/3.8 G
3 POWDER (GRAM) ORAL DAILY
COMMUNITY
Start: 2021-09-27

## 2023-05-21 RX ORDER — TRAZODONE HYDROCHLORIDE 50 MG/1
1 TABLET ORAL NIGHTLY PRN
COMMUNITY
Start: 2022-03-25

## 2023-05-21 RX ORDER — MIDODRINE HYDROCHLORIDE 2.5 MG/1
2.5 TABLET ORAL DAILY
COMMUNITY
Start: 2023-02-28

## 2023-05-21 RX ORDER — POLYETHYLENE GLYCOL 3350 17 G/17G
17 POWDER, FOR SOLUTION ORAL DAILY
COMMUNITY
Start: 2020-01-08

## 2023-05-21 RX ORDER — TRIAMCINOLONE ACETONIDE 1 MG/G
CREAM TOPICAL
COMMUNITY
Start: 2023-01-26

## 2023-05-26 ENCOUNTER — TELEPHONE (OUTPATIENT)
Age: 83
End: 2023-05-26

## 2023-05-30 ENCOUNTER — TELEPHONE (OUTPATIENT)
Age: 83
End: 2023-05-30

## 2023-08-22 ENCOUNTER — TELEPHONE (OUTPATIENT)
Age: 83
End: 2023-08-22

## 2023-08-22 DIAGNOSIS — R39.81 FUNCTIONAL URINARY INCONTINENCE: ICD-10-CM

## 2023-08-22 DIAGNOSIS — M54.50 CHRONIC BILATERAL LOW BACK PAIN, UNSPECIFIED WHETHER SCIATICA PRESENT: ICD-10-CM

## 2023-08-22 DIAGNOSIS — S06.5XAA SUBDURAL HEMATOMA (HCC): ICD-10-CM

## 2023-08-22 DIAGNOSIS — G30.8 SEVERE ALZHEIMER'S DEMENTIA OF OTHER ONSET WITH OTHER BEHAVIORAL DISTURBANCE (HCC): Primary | ICD-10-CM

## 2023-08-22 DIAGNOSIS — F02.C18 SEVERE ALZHEIMER'S DEMENTIA OF OTHER ONSET WITH OTHER BEHAVIORAL DISTURBANCE (HCC): Primary | ICD-10-CM

## 2023-08-22 DIAGNOSIS — G89.29 CHRONIC BILATERAL LOW BACK PAIN, UNSPECIFIED WHETHER SCIATICA PRESENT: ICD-10-CM

## 2023-08-22 DIAGNOSIS — Z86.79 HISTORY OF SUBARACHNOID HEMORRHAGE: ICD-10-CM

## 2023-08-22 DIAGNOSIS — I95.1 ORTHOSTATIC HYPOTENSION: ICD-10-CM

## 2023-08-22 DIAGNOSIS — Z66 DNR (DO NOT RESUSCITATE): ICD-10-CM

## 2023-08-22 NOTE — TELEPHONE ENCOUNTER
[facility] hospice called, patient needs a hospice evaluation. They need an order for the evaluation, along with progress notes and face sheet with insurance information.  Faxed to [phone]

## 2023-08-22 NOTE — TELEPHONE ENCOUNTER
Patients order for the hospice evaluation, along with progress notes and face sheet and  insurance information faxed to [facility] F# [phone] as requested for continuity of care.

## 2023-08-22 NOTE — TELEPHONE ENCOUNTER
Referral placed. Please fax. Severe dementia due to AD. Patient is nonverbal due to severe dementia. Makes decent eye contact and smiles, nods, but does not understand conversations.  Claire Hyatt and Omar Brock. Claire Hyatt is undergoing tx for metastatic colon cancer at [facility].

## 2023-08-23 ENCOUNTER — TELEPHONE (OUTPATIENT)
Age: 83
End: 2023-08-23

## 2023-08-23 NOTE — TELEPHONE ENCOUNTER
Spoke with Roma Frank and Rain/Rizwana (HIPPA VERIFIED) and they request to maintain the referral for Hospice with [facility] and disregard the request for [facility] at this time. Grateful for the call. Spoke with Tom Andrews at [facility] [phone] and notified. Grateful or the call.

## 2023-08-23 NOTE — TELEPHONE ENCOUNTER
[address] [facility] needs an order to evaluate and treat the patient, patient has alzheimer's    F: [phone]

## 2024-01-03 NOTE — ED TRIAGE NOTES
Patient arrives with  to triage. Patient arrives with c/o laceration to posterior side of head following GLF at home. Per , patient has hx of Alzheimer's and was helping her get dressed in bathroom following bath. Patient fell backwards, hitting head on wall of bathtub. Denies LOC. Bleeding is controlled. No use of blood thinning medication. Patient is alert, with hx of aphasia. Clothing

## (undated) DEVICE — SOLIDIFIER MEDC 1200ML -- CONVERT TO 356117

## (undated) DEVICE — NDL PRT INJ NSAF BLNT 18GX1.5 --

## (undated) DEVICE — Device

## (undated) DEVICE — SYR 3ML LL TIP 1/10ML GRAD --

## (undated) DEVICE — SET ADMIN 16ML TBNG L100IN 2 Y INJ SITE IV PIGGY BK DISP

## (undated) DEVICE — SYR 5ML 1/5 GRAD LL NSAF LF --

## (undated) DEVICE — 1200 GUARD II KIT W/5MM TUBE W/O VAC TUBE: Brand: GUARDIAN

## (undated) DEVICE — CANNULA CUSH AD W/ 14FT TBG

## (undated) DEVICE — NDL FLTR TIP 5 MIC 18GX1.5IN --

## (undated) DEVICE — KIT COLON W/ 1.1OZ LUB AND 2 END

## (undated) DEVICE — BITEBLOCK ENDOSCP 60FR MAXI WHT POLYETH STURDY W/ VELC WVN

## (undated) DEVICE — KENDALL RADIOLUCENT FOAM MONITORING ELECTRODE -RECTANGULAR SHAPE: Brand: KENDALL